# Patient Record
Sex: MALE | Race: WHITE | NOT HISPANIC OR LATINO | URBAN - METROPOLITAN AREA
[De-identification: names, ages, dates, MRNs, and addresses within clinical notes are randomized per-mention and may not be internally consistent; named-entity substitution may affect disease eponyms.]

---

## 2024-01-01 ENCOUNTER — INPATIENT (INPATIENT)
Facility: HOSPITAL | Age: 89
LOS: 5 days | DRG: 871 | End: 2024-07-28
Attending: INTERNAL MEDICINE | Admitting: INTERNAL MEDICINE
Payer: MEDICARE

## 2024-01-01 VITALS
HEIGHT: 65 IN | OXYGEN SATURATION: 98 % | HEART RATE: 109 BPM | DIASTOLIC BLOOD PRESSURE: 61 MMHG | RESPIRATION RATE: 12 BRPM | SYSTOLIC BLOOD PRESSURE: 93 MMHG | WEIGHT: 80.03 LBS | TEMPERATURE: 99 F

## 2024-01-01 VITALS
TEMPERATURE: 97 F | DIASTOLIC BLOOD PRESSURE: 53 MMHG | HEART RATE: 113 BPM | OXYGEN SATURATION: 91 % | RESPIRATION RATE: 20 BRPM | SYSTOLIC BLOOD PRESSURE: 90 MMHG

## 2024-01-01 DIAGNOSIS — N39.0 URINARY TRACT INFECTION, SITE NOT SPECIFIED: ICD-10-CM

## 2024-01-01 DIAGNOSIS — U07.1 COVID-19: ICD-10-CM

## 2024-01-01 DIAGNOSIS — F03.90 UNSPECIFIED DEMENTIA, UNSPECIFIED SEVERITY, WITHOUT BEHAVIORAL DISTURBANCE, PSYCHOTIC DISTURBANCE, MOOD DISTURBANCE, AND ANXIETY: ICD-10-CM

## 2024-01-01 DIAGNOSIS — I95.9 HYPOTENSION, UNSPECIFIED: ICD-10-CM

## 2024-01-01 DIAGNOSIS — E87.0 HYPEROSMOLALITY AND HYPERNATREMIA: ICD-10-CM

## 2024-01-01 DIAGNOSIS — N17.9 ACUTE KIDNEY FAILURE, UNSPECIFIED: ICD-10-CM

## 2024-01-01 DIAGNOSIS — R62.7 ADULT FAILURE TO THRIVE: ICD-10-CM

## 2024-01-01 DIAGNOSIS — R09.89 OTHER SPECIFIED SYMPTOMS AND SIGNS INVOLVING THE CIRCULATORY AND RESPIRATORY SYSTEMS: ICD-10-CM

## 2024-01-01 LAB
A1C WITH ESTIMATED AVERAGE GLUCOSE RESULT: 5.9 % — HIGH (ref 4–5.6)
ALBUMIN SERPL ELPH-MCNC: 2 G/DL — LOW (ref 3.3–5)
ALBUMIN SERPL ELPH-MCNC: 2 G/DL — LOW (ref 3.3–5)
ALBUMIN SERPL ELPH-MCNC: 2.8 G/DL — LOW (ref 3.3–5)
ALBUMIN SERPL ELPH-MCNC: 3.1 G/DL — LOW (ref 3.3–5)
ALP SERPL-CCNC: 66 U/L — SIGNIFICANT CHANGE UP (ref 40–120)
ALP SERPL-CCNC: 78 U/L — SIGNIFICANT CHANGE UP (ref 40–120)
ALP SERPL-CCNC: 79 U/L — SIGNIFICANT CHANGE UP (ref 40–120)
ALP SERPL-CCNC: 90 U/L — SIGNIFICANT CHANGE UP (ref 40–120)
ALT FLD-CCNC: 13 U/L — SIGNIFICANT CHANGE UP (ref 10–45)
ALT FLD-CCNC: 18 U/L — SIGNIFICANT CHANGE UP (ref 10–45)
ALT FLD-CCNC: 20 U/L — SIGNIFICANT CHANGE UP (ref 10–45)
ALT FLD-CCNC: 24 U/L — SIGNIFICANT CHANGE UP (ref 10–45)
ANION GAP SERPL CALC-SCNC: 11 MMOL/L — SIGNIFICANT CHANGE UP (ref 5–17)
ANION GAP SERPL CALC-SCNC: 14 MMOL/L — SIGNIFICANT CHANGE UP (ref 5–17)
ANION GAP SERPL CALC-SCNC: 14 MMOL/L — SIGNIFICANT CHANGE UP (ref 5–17)
ANION GAP SERPL CALC-SCNC: 15 MMOL/L — SIGNIFICANT CHANGE UP (ref 5–17)
ANION GAP SERPL CALC-SCNC: 15 MMOL/L — SIGNIFICANT CHANGE UP (ref 5–17)
ANION GAP SERPL CALC-SCNC: 16 MMOL/L — SIGNIFICANT CHANGE UP (ref 5–17)
ANION GAP SERPL CALC-SCNC: 17 MMOL/L — SIGNIFICANT CHANGE UP (ref 5–17)
ANION GAP SERPL CALC-SCNC: 17 MMOL/L — SIGNIFICANT CHANGE UP (ref 5–17)
ANION GAP SERPL CALC-SCNC: 20 MMOL/L — HIGH (ref 5–17)
ANION GAP SERPL CALC-SCNC: 24 MMOL/L — HIGH (ref 5–17)
ANISOCYTOSIS BLD QL: SLIGHT — SIGNIFICANT CHANGE UP
APPEARANCE UR: ABNORMAL
APTT BLD: 28.4 SEC — SIGNIFICANT CHANGE UP (ref 24.5–35.6)
AST SERPL-CCNC: 33 U/L — SIGNIFICANT CHANGE UP (ref 10–40)
AST SERPL-CCNC: 36 U/L — SIGNIFICANT CHANGE UP (ref 10–40)
AST SERPL-CCNC: 36 U/L — SIGNIFICANT CHANGE UP (ref 10–40)
AST SERPL-CCNC: 55 U/L — HIGH (ref 10–40)
BACTERIA # UR AUTO: NEGATIVE /HPF — SIGNIFICANT CHANGE UP
BASE EXCESS BLDV CALC-SCNC: -2 MMOL/L — SIGNIFICANT CHANGE UP (ref -2–3)
BASOPHILS # BLD AUTO: 0 K/UL — SIGNIFICANT CHANGE UP (ref 0–0.2)
BASOPHILS # BLD AUTO: 0 K/UL — SIGNIFICANT CHANGE UP (ref 0–0.2)
BASOPHILS # BLD AUTO: 0.01 K/UL — SIGNIFICANT CHANGE UP (ref 0–0.2)
BASOPHILS # BLD AUTO: 0.05 K/UL — SIGNIFICANT CHANGE UP (ref 0–0.2)
BASOPHILS NFR BLD AUTO: 0 % — SIGNIFICANT CHANGE UP (ref 0–2)
BASOPHILS NFR BLD AUTO: 0 % — SIGNIFICANT CHANGE UP (ref 0–2)
BASOPHILS NFR BLD AUTO: 0.2 % — SIGNIFICANT CHANGE UP (ref 0–2)
BASOPHILS NFR BLD AUTO: 0.9 % — SIGNIFICANT CHANGE UP (ref 0–2)
BILIRUB SERPL-MCNC: 0.3 MG/DL — SIGNIFICANT CHANGE UP (ref 0.2–1.2)
BILIRUB SERPL-MCNC: 0.6 MG/DL — SIGNIFICANT CHANGE UP (ref 0.2–1.2)
BILIRUB UR-MCNC: NEGATIVE — SIGNIFICANT CHANGE UP
BLD GP AB SCN SERPL QL: NEGATIVE — SIGNIFICANT CHANGE UP
BUN SERPL-MCNC: 100 MG/DL — HIGH (ref 7–23)
BUN SERPL-MCNC: 102 MG/DL — HIGH (ref 7–23)
BUN SERPL-MCNC: 107 MG/DL — HIGH (ref 7–23)
BUN SERPL-MCNC: 122 MG/DL — HIGH (ref 7–23)
BUN SERPL-MCNC: 138 MG/DL — HIGH (ref 7–23)
BUN SERPL-MCNC: 94 MG/DL — HIGH (ref 7–23)
BUN SERPL-MCNC: 95 MG/DL — HIGH (ref 7–23)
BUN SERPL-MCNC: 96 MG/DL — HIGH (ref 7–23)
BUN SERPL-MCNC: 97 MG/DL — HIGH (ref 7–23)
BUN SERPL-MCNC: 97 MG/DL — HIGH (ref 7–23)
BUN SERPL-MCNC: 98 MG/DL — HIGH (ref 7–23)
BUN SERPL-MCNC: 98 MG/DL — HIGH (ref 7–23)
CA-I SERPL-SCNC: 1.39 MMOL/L — HIGH (ref 1.15–1.33)
CALCIUM SERPL-MCNC: 10.1 MG/DL — SIGNIFICANT CHANGE UP (ref 8.4–10.5)
CALCIUM SERPL-MCNC: 10.2 MG/DL — SIGNIFICANT CHANGE UP (ref 8.4–10.5)
CALCIUM SERPL-MCNC: 10.9 MG/DL — HIGH (ref 8.4–10.5)
CALCIUM SERPL-MCNC: 11.1 MG/DL — HIGH (ref 8.4–10.5)
CALCIUM SERPL-MCNC: 11.3 MG/DL — HIGH (ref 8.4–10.5)
CALCIUM SERPL-MCNC: 11.5 MG/DL — HIGH (ref 8.4–10.5)
CALCIUM SERPL-MCNC: 11.6 MG/DL — HIGH (ref 8.4–10.5)
CALCIUM SERPL-MCNC: 12.4 MG/DL — HIGH (ref 8.4–10.5)
CALCIUM SERPL-MCNC: 9.4 MG/DL — SIGNIFICANT CHANGE UP (ref 8.4–10.5)
CALCIUM SERPL-MCNC: 9.5 MG/DL — SIGNIFICANT CHANGE UP (ref 8.4–10.5)
CAST: 4 /LPF — SIGNIFICANT CHANGE UP (ref 0–4)
CHLORIDE BLDV-SCNC: 113 MMOL/L — HIGH (ref 96–108)
CHLORIDE SERPL-SCNC: 104 MMOL/L — SIGNIFICANT CHANGE UP (ref 96–108)
CHLORIDE SERPL-SCNC: 107 MMOL/L — SIGNIFICANT CHANGE UP (ref 96–108)
CHLORIDE SERPL-SCNC: 109 MMOL/L — HIGH (ref 96–108)
CHLORIDE SERPL-SCNC: 112 MMOL/L — HIGH (ref 96–108)
CHLORIDE SERPL-SCNC: 112 MMOL/L — HIGH (ref 96–108)
CHLORIDE SERPL-SCNC: 120 MMOL/L — HIGH (ref 96–108)
CHLORIDE SERPL-SCNC: 122 MMOL/L — HIGH (ref 96–108)
CHLORIDE SERPL-SCNC: 124 MMOL/L — HIGH (ref 96–108)
CHLORIDE SERPL-SCNC: 125 MMOL/L — HIGH (ref 96–108)
CHLORIDE SERPL-SCNC: 99 MMOL/L — SIGNIFICANT CHANGE UP (ref 96–108)
CO2 BLDV-SCNC: 23 MMOL/L — SIGNIFICANT CHANGE UP (ref 22–26)
CO2 SERPL-SCNC: 16 MMOL/L — LOW (ref 22–31)
CO2 SERPL-SCNC: 17 MMOL/L — LOW (ref 22–31)
CO2 SERPL-SCNC: 19 MMOL/L — LOW (ref 22–31)
CO2 SERPL-SCNC: 21 MMOL/L — LOW (ref 22–31)
CO2 SERPL-SCNC: 21 MMOL/L — LOW (ref 22–31)
CO2 SERPL-SCNC: 23 MMOL/L — SIGNIFICANT CHANGE UP (ref 22–31)
CO2 SERPL-SCNC: 24 MMOL/L — SIGNIFICANT CHANGE UP (ref 22–31)
CO2 SERPL-SCNC: 25 MMOL/L — SIGNIFICANT CHANGE UP (ref 22–31)
CO2 SERPL-SCNC: 25 MMOL/L — SIGNIFICANT CHANGE UP (ref 22–31)
CO2 SERPL-SCNC: 27 MMOL/L — SIGNIFICANT CHANGE UP (ref 22–31)
COARSE GRAN CASTS #/AREA URNS AUTO: PRESENT
COLOR SPEC: YELLOW — SIGNIFICANT CHANGE UP
CREAT ?TM UR-MCNC: 58 MG/DL — SIGNIFICANT CHANGE UP
CREAT SERPL-MCNC: 2.79 MG/DL — HIGH (ref 0.5–1.3)
CREAT SERPL-MCNC: 2.86 MG/DL — HIGH (ref 0.5–1.3)
CREAT SERPL-MCNC: 2.86 MG/DL — HIGH (ref 0.5–1.3)
CREAT SERPL-MCNC: 2.87 MG/DL — HIGH (ref 0.5–1.3)
CREAT SERPL-MCNC: 2.99 MG/DL — HIGH (ref 0.5–1.3)
CREAT SERPL-MCNC: 3.02 MG/DL — HIGH (ref 0.5–1.3)
CREAT SERPL-MCNC: 3.03 MG/DL — HIGH (ref 0.5–1.3)
CREAT SERPL-MCNC: 3.06 MG/DL — HIGH (ref 0.5–1.3)
CREAT SERPL-MCNC: 3.11 MG/DL — HIGH (ref 0.5–1.3)
CREAT SERPL-MCNC: 3.3 MG/DL — HIGH (ref 0.5–1.3)
CREAT SERPL-MCNC: 3.53 MG/DL — HIGH (ref 0.5–1.3)
CREAT SERPL-MCNC: 3.95 MG/DL — HIGH (ref 0.5–1.3)
CULTURE RESULTS: NO GROWTH — SIGNIFICANT CHANGE UP
CULTURE RESULTS: SIGNIFICANT CHANGE UP
CULTURE RESULTS: SIGNIFICANT CHANGE UP
DACRYOCYTES BLD QL SMEAR: SLIGHT — SIGNIFICANT CHANGE UP
DIFF PNL FLD: ABNORMAL
EGFR: 13 ML/MIN/1.73M2 — LOW
EGFR: 15 ML/MIN/1.73M2 — LOW
EGFR: 16 ML/MIN/1.73M2 — LOW
EGFR: 17 ML/MIN/1.73M2 — LOW
EGFR: 18 ML/MIN/1.73M2 — LOW
EGFR: 19 ML/MIN/1.73M2 — LOW
EGFR: 20 ML/MIN/1.73M2 — LOW
EOSINOPHIL # BLD AUTO: 0 K/UL — SIGNIFICANT CHANGE UP (ref 0–0.5)
EOSINOPHIL # BLD AUTO: 0 K/UL — SIGNIFICANT CHANGE UP (ref 0–0.5)
EOSINOPHIL # BLD AUTO: 0.04 K/UL — SIGNIFICANT CHANGE UP (ref 0–0.5)
EOSINOPHIL # BLD AUTO: 0.05 K/UL — SIGNIFICANT CHANGE UP (ref 0–0.5)
EOSINOPHIL NFR BLD AUTO: 0 % — SIGNIFICANT CHANGE UP (ref 0–6)
EOSINOPHIL NFR BLD AUTO: 0 % — SIGNIFICANT CHANGE UP (ref 0–6)
EOSINOPHIL NFR BLD AUTO: 0.7 % — SIGNIFICANT CHANGE UP (ref 0–6)
EOSINOPHIL NFR BLD AUTO: 0.9 % — SIGNIFICANT CHANGE UP (ref 0–6)
ESTIMATED AVERAGE GLUCOSE: 123 MG/DL — HIGH (ref 68–114)
FLUAV AG NPH QL: SIGNIFICANT CHANGE UP
FLUBV AG NPH QL: SIGNIFICANT CHANGE UP
GAS PNL BLDA: SIGNIFICANT CHANGE UP
GAS PNL BLDV: 142 MMOL/L — SIGNIFICANT CHANGE UP (ref 136–145)
GAS PNL BLDV: SIGNIFICANT CHANGE UP
GAS PNL BLDV: SIGNIFICANT CHANGE UP
GLUCOSE BLDC GLUCOMTR-MCNC: 125 MG/DL — HIGH (ref 70–99)
GLUCOSE BLDC GLUCOMTR-MCNC: 135 MG/DL — HIGH (ref 70–99)
GLUCOSE BLDC GLUCOMTR-MCNC: 143 MG/DL — HIGH (ref 70–99)
GLUCOSE BLDC GLUCOMTR-MCNC: 151 MG/DL — HIGH (ref 70–99)
GLUCOSE BLDC GLUCOMTR-MCNC: 154 MG/DL — HIGH (ref 70–99)
GLUCOSE BLDC GLUCOMTR-MCNC: 158 MG/DL — HIGH (ref 70–99)
GLUCOSE BLDC GLUCOMTR-MCNC: 159 MG/DL — HIGH (ref 70–99)
GLUCOSE BLDC GLUCOMTR-MCNC: 168 MG/DL — HIGH (ref 70–99)
GLUCOSE BLDC GLUCOMTR-MCNC: 170 MG/DL — HIGH (ref 70–99)
GLUCOSE BLDC GLUCOMTR-MCNC: 170 MG/DL — HIGH (ref 70–99)
GLUCOSE BLDC GLUCOMTR-MCNC: 189 MG/DL — HIGH (ref 70–99)
GLUCOSE BLDC GLUCOMTR-MCNC: 201 MG/DL — HIGH (ref 70–99)
GLUCOSE BLDC GLUCOMTR-MCNC: 202 MG/DL — HIGH (ref 70–99)
GLUCOSE BLDC GLUCOMTR-MCNC: 222 MG/DL — HIGH (ref 70–99)
GLUCOSE BLDC GLUCOMTR-MCNC: 231 MG/DL — HIGH (ref 70–99)
GLUCOSE BLDC GLUCOMTR-MCNC: 234 MG/DL — HIGH (ref 70–99)
GLUCOSE BLDC GLUCOMTR-MCNC: 265 MG/DL — HIGH (ref 70–99)
GLUCOSE BLDC GLUCOMTR-MCNC: 65 MG/DL — LOW (ref 70–99)
GLUCOSE BLDC GLUCOMTR-MCNC: 76 MG/DL — SIGNIFICANT CHANGE UP (ref 70–99)
GLUCOSE BLDV-MCNC: 159 MG/DL — HIGH (ref 70–99)
GLUCOSE SERPL-MCNC: 123 MG/DL — HIGH (ref 70–99)
GLUCOSE SERPL-MCNC: 133 MG/DL — HIGH (ref 70–99)
GLUCOSE SERPL-MCNC: 169 MG/DL — HIGH (ref 70–99)
GLUCOSE SERPL-MCNC: 173 MG/DL — HIGH (ref 70–99)
GLUCOSE SERPL-MCNC: 173 MG/DL — HIGH (ref 70–99)
GLUCOSE SERPL-MCNC: 188 MG/DL — HIGH (ref 70–99)
GLUCOSE SERPL-MCNC: 226 MG/DL — HIGH (ref 70–99)
GLUCOSE SERPL-MCNC: 546 MG/DL — CRITICAL HIGH (ref 70–99)
GLUCOSE SERPL-MCNC: 57 MG/DL — LOW (ref 70–99)
GLUCOSE SERPL-MCNC: 67 MG/DL — LOW (ref 70–99)
GLUCOSE SERPL-MCNC: 77 MG/DL — SIGNIFICANT CHANGE UP (ref 70–99)
GLUCOSE SERPL-MCNC: 99 MG/DL — SIGNIFICANT CHANGE UP (ref 70–99)
GLUCOSE UR QL: NEGATIVE MG/DL — SIGNIFICANT CHANGE UP
HCO3 BLDV-SCNC: 22 MMOL/L — SIGNIFICANT CHANGE UP (ref 22–29)
HCT VFR BLD CALC: 22.5 % — LOW (ref 39–50)
HCT VFR BLD CALC: 24.8 % — LOW (ref 39–50)
HCT VFR BLD CALC: 28 % — LOW (ref 39–50)
HCT VFR BLD CALC: 28.1 % — LOW (ref 39–50)
HCT VFR BLD CALC: 29.1 % — LOW (ref 39–50)
HCT VFR BLD CALC: 33.1 % — LOW (ref 39–50)
HCT VFR BLD CALC: 35.2 % — LOW (ref 39–50)
HCT VFR BLD CALC: 37.8 % — LOW (ref 39–50)
HCT VFR BLDA CALC: 25 % — LOW (ref 39–51)
HGB BLD CALC-MCNC: 8.4 G/DL — LOW (ref 12.6–17.4)
HGB BLD-MCNC: 10.5 G/DL — LOW (ref 13–17)
HGB BLD-MCNC: 11.2 G/DL — LOW (ref 13–17)
HGB BLD-MCNC: 6.9 G/DL — CRITICAL LOW (ref 13–17)
HGB BLD-MCNC: 8.7 G/DL — LOW (ref 13–17)
HGB BLD-MCNC: 8.8 G/DL — LOW (ref 13–17)
HGB BLD-MCNC: 8.9 G/DL — LOW (ref 13–17)
HGB BLD-MCNC: 9.4 G/DL — LOW (ref 13–17)
HGB BLD-MCNC: 9.9 G/DL — LOW (ref 13–17)
IMM GRANULOCYTES NFR BLD AUTO: 0.4 % — SIGNIFICANT CHANGE UP (ref 0–0.9)
INR BLD: 1.42 RATIO — HIGH (ref 0.85–1.18)
KETONES UR-MCNC: NEGATIVE MG/DL — SIGNIFICANT CHANGE UP
LACTATE BLDV-MCNC: 3.7 MMOL/L — HIGH (ref 0.5–2)
LACTATE SERPL-SCNC: 2.1 MMOL/L — HIGH (ref 0.5–2)
LACTATE SERPL-SCNC: 2.4 MMOL/L — HIGH (ref 0.5–2)
LACTATE SERPL-SCNC: 2.5 MMOL/L — HIGH (ref 0.5–2)
LACTATE SERPL-SCNC: 3.2 MMOL/L — HIGH (ref 0.5–2)
LACTATE SERPL-SCNC: 4.4 MMOL/L — CRITICAL HIGH (ref 0.5–2)
LACTATE SERPL-SCNC: 4.7 MMOL/L — CRITICAL HIGH (ref 0.5–2)
LEUKOCYTE ESTERASE UR-ACNC: ABNORMAL
LYMPHOCYTES # BLD AUTO: 0.45 K/UL — LOW (ref 1–3.3)
LYMPHOCYTES # BLD AUTO: 0.54 K/UL — LOW (ref 1–3.3)
LYMPHOCYTES # BLD AUTO: 0.56 K/UL — LOW (ref 1–3.3)
LYMPHOCYTES # BLD AUTO: 1.11 K/UL — SIGNIFICANT CHANGE UP (ref 1–3.3)
LYMPHOCYTES # BLD AUTO: 10.4 % — LOW (ref 13–44)
LYMPHOCYTES # BLD AUTO: 10.5 % — LOW (ref 13–44)
LYMPHOCYTES # BLD AUTO: 20.6 % — SIGNIFICANT CHANGE UP (ref 13–44)
LYMPHOCYTES # BLD AUTO: 5.1 % — LOW (ref 13–44)
MAGNESIUM SERPL-MCNC: 1.8 MG/DL — SIGNIFICANT CHANGE UP (ref 1.6–2.6)
MAGNESIUM SERPL-MCNC: 1.8 MG/DL — SIGNIFICANT CHANGE UP (ref 1.6–2.6)
MAGNESIUM SERPL-MCNC: 2 MG/DL — SIGNIFICANT CHANGE UP (ref 1.6–2.6)
MAGNESIUM SERPL-MCNC: 2.2 MG/DL — SIGNIFICANT CHANGE UP (ref 1.6–2.6)
MAGNESIUM SERPL-MCNC: 2.2 MG/DL — SIGNIFICANT CHANGE UP (ref 1.6–2.6)
MANUAL SMEAR VERIFICATION: SIGNIFICANT CHANGE UP
MCHC RBC-ENTMCNC: 29.6 GM/DL — LOW (ref 32–36)
MCHC RBC-ENTMCNC: 29.8 GM/DL — LOW (ref 32–36)
MCHC RBC-ENTMCNC: 29.9 GM/DL — LOW (ref 32–36)
MCHC RBC-ENTMCNC: 30.2 GM/DL — LOW (ref 32–36)
MCHC RBC-ENTMCNC: 30.7 GM/DL — LOW (ref 32–36)
MCHC RBC-ENTMCNC: 31.6 PG — SIGNIFICANT CHANGE UP (ref 27–34)
MCHC RBC-ENTMCNC: 31.7 GM/DL — LOW (ref 32–36)
MCHC RBC-ENTMCNC: 31.8 PG — SIGNIFICANT CHANGE UP (ref 27–34)
MCHC RBC-ENTMCNC: 32 PG — SIGNIFICANT CHANGE UP (ref 27–34)
MCHC RBC-ENTMCNC: 32 PG — SIGNIFICANT CHANGE UP (ref 27–34)
MCHC RBC-ENTMCNC: 32.1 PG — SIGNIFICANT CHANGE UP (ref 27–34)
MCHC RBC-ENTMCNC: 32.2 PG — SIGNIFICANT CHANGE UP (ref 27–34)
MCHC RBC-ENTMCNC: 32.6 PG — SIGNIFICANT CHANGE UP (ref 27–34)
MCHC RBC-ENTMCNC: 33.3 PG — SIGNIFICANT CHANGE UP (ref 27–34)
MCHC RBC-ENTMCNC: 33.6 GM/DL — SIGNIFICANT CHANGE UP (ref 32–36)
MCHC RBC-ENTMCNC: 35.1 GM/DL — SIGNIFICANT CHANGE UP (ref 32–36)
MCV RBC AUTO: 102.9 FL — HIGH (ref 80–100)
MCV RBC AUTO: 105.1 FL — HIGH (ref 80–100)
MCV RBC AUTO: 105.1 FL — HIGH (ref 80–100)
MCV RBC AUTO: 105.8 FL — HIGH (ref 80–100)
MCV RBC AUTO: 107.3 FL — HIGH (ref 80–100)
MCV RBC AUTO: 108 FL — HIGH (ref 80–100)
MCV RBC AUTO: 91.5 FL — SIGNIFICANT CHANGE UP (ref 80–100)
MCV RBC AUTO: 99.3 FL — SIGNIFICANT CHANGE UP (ref 80–100)
METAMYELOCYTES # FLD: 0.9 % — HIGH (ref 0–0)
METAMYELOCYTES # FLD: 1.7 % — HIGH (ref 0–0)
MONOCYTES # BLD AUTO: 0.08 K/UL — SIGNIFICANT CHANGE UP (ref 0–0.9)
MONOCYTES # BLD AUTO: 0.14 K/UL — SIGNIFICANT CHANGE UP (ref 0–0.9)
MONOCYTES # BLD AUTO: 0.26 K/UL — SIGNIFICANT CHANGE UP (ref 0–0.9)
MONOCYTES # BLD AUTO: 0.46 K/UL — SIGNIFICANT CHANGE UP (ref 0–0.9)
MONOCYTES NFR BLD AUTO: 1.9 % — LOW (ref 2–14)
MONOCYTES NFR BLD AUTO: 2.6 % — SIGNIFICANT CHANGE UP (ref 2–14)
MONOCYTES NFR BLD AUTO: 4.3 % — SIGNIFICANT CHANGE UP (ref 2–14)
MONOCYTES NFR BLD AUTO: 4.8 % — SIGNIFICANT CHANGE UP (ref 2–14)
MRSA PCR RESULT.: DETECTED
NEUTROPHILS # BLD AUTO: 3.78 K/UL — SIGNIFICANT CHANGE UP (ref 1.8–7.4)
NEUTROPHILS # BLD AUTO: 3.94 K/UL — SIGNIFICANT CHANGE UP (ref 1.8–7.4)
NEUTROPHILS # BLD AUTO: 4.53 K/UL — SIGNIFICANT CHANGE UP (ref 1.8–7.4)
NEUTROPHILS # BLD AUTO: 9.44 K/UL — HIGH (ref 1.8–7.4)
NEUTROPHILS NFR BLD AUTO: 73.3 % — SIGNIFICANT CHANGE UP (ref 43–77)
NEUTROPHILS NFR BLD AUTO: 74.4 % — SIGNIFICANT CHANGE UP (ref 43–77)
NEUTROPHILS NFR BLD AUTO: 77.4 % — HIGH (ref 43–77)
NEUTROPHILS NFR BLD AUTO: 79 % — HIGH (ref 43–77)
NEUTS BAND # BLD: 14.5 % — HIGH (ref 0–8)
NEUTS BAND # BLD: 6.1 % — SIGNIFICANT CHANGE UP (ref 0–8)
NEUTS BAND # BLD: 9.4 % — HIGH (ref 0–8)
NITRITE UR-MCNC: NEGATIVE — SIGNIFICANT CHANGE UP
NRBC # BLD: 0 /100 WBCS — SIGNIFICANT CHANGE UP (ref 0–0)
OSMOLALITY UR: 516 MOS/KG — SIGNIFICANT CHANGE UP (ref 300–900)
OVALOCYTES BLD QL SMEAR: SLIGHT — SIGNIFICANT CHANGE UP
PCO2 BLDV: 32 MMHG — LOW (ref 42–55)
PH BLDV: 7.44 — HIGH (ref 7.32–7.43)
PH UR: 6.5 — SIGNIFICANT CHANGE UP (ref 5–8)
PHOSPHATE SERPL-MCNC: 3.1 MG/DL — SIGNIFICANT CHANGE UP (ref 2.5–4.5)
PHOSPHATE SERPL-MCNC: 3.4 MG/DL — SIGNIFICANT CHANGE UP (ref 2.5–4.5)
PHOSPHATE SERPL-MCNC: 4.1 MG/DL — SIGNIFICANT CHANGE UP (ref 2.5–4.5)
PHOSPHATE SERPL-MCNC: 4.2 MG/DL — SIGNIFICANT CHANGE UP (ref 2.5–4.5)
PHOSPHATE SERPL-MCNC: 5 MG/DL — HIGH (ref 2.5–4.5)
PLAT MORPH BLD: NORMAL — SIGNIFICANT CHANGE UP
PLATELET # BLD AUTO: 101 K/UL — LOW (ref 150–400)
PLATELET # BLD AUTO: 107 K/UL — LOW (ref 150–400)
PLATELET # BLD AUTO: 112 K/UL — LOW (ref 150–400)
PLATELET # BLD AUTO: 118 K/UL — LOW (ref 150–400)
PLATELET # BLD AUTO: 128 K/UL — LOW (ref 150–400)
PLATELET # BLD AUTO: 77 K/UL — LOW (ref 150–400)
PLATELET # BLD AUTO: 79 K/UL — LOW (ref 150–400)
PLATELET # BLD AUTO: 85 K/UL — LOW (ref 150–400)
PO2 BLDV: 29 MMHG — SIGNIFICANT CHANGE UP (ref 25–45)
POIKILOCYTOSIS BLD QL AUTO: SLIGHT — SIGNIFICANT CHANGE UP
POTASSIUM BLDV-SCNC: 3.6 MMOL/L — SIGNIFICANT CHANGE UP (ref 3.5–5.1)
POTASSIUM SERPL-MCNC: 3.7 MMOL/L — SIGNIFICANT CHANGE UP (ref 3.5–5.3)
POTASSIUM SERPL-MCNC: 3.7 MMOL/L — SIGNIFICANT CHANGE UP (ref 3.5–5.3)
POTASSIUM SERPL-MCNC: 3.8 MMOL/L — SIGNIFICANT CHANGE UP (ref 3.5–5.3)
POTASSIUM SERPL-MCNC: 3.8 MMOL/L — SIGNIFICANT CHANGE UP (ref 3.5–5.3)
POTASSIUM SERPL-MCNC: 4 MMOL/L — SIGNIFICANT CHANGE UP (ref 3.5–5.3)
POTASSIUM SERPL-MCNC: 4.3 MMOL/L — SIGNIFICANT CHANGE UP (ref 3.5–5.3)
POTASSIUM SERPL-MCNC: 4.4 MMOL/L — SIGNIFICANT CHANGE UP (ref 3.5–5.3)
POTASSIUM SERPL-MCNC: 4.4 MMOL/L — SIGNIFICANT CHANGE UP (ref 3.5–5.3)
POTASSIUM SERPL-MCNC: 4.5 MMOL/L — SIGNIFICANT CHANGE UP (ref 3.5–5.3)
POTASSIUM SERPL-MCNC: 4.5 MMOL/L — SIGNIFICANT CHANGE UP (ref 3.5–5.3)
POTASSIUM SERPL-MCNC: 4.6 MMOL/L — SIGNIFICANT CHANGE UP (ref 3.5–5.3)
POTASSIUM SERPL-MCNC: 4.9 MMOL/L — SIGNIFICANT CHANGE UP (ref 3.5–5.3)
POTASSIUM SERPL-SCNC: 3.7 MMOL/L — SIGNIFICANT CHANGE UP (ref 3.5–5.3)
POTASSIUM SERPL-SCNC: 3.7 MMOL/L — SIGNIFICANT CHANGE UP (ref 3.5–5.3)
POTASSIUM SERPL-SCNC: 3.8 MMOL/L — SIGNIFICANT CHANGE UP (ref 3.5–5.3)
POTASSIUM SERPL-SCNC: 3.8 MMOL/L — SIGNIFICANT CHANGE UP (ref 3.5–5.3)
POTASSIUM SERPL-SCNC: 4 MMOL/L — SIGNIFICANT CHANGE UP (ref 3.5–5.3)
POTASSIUM SERPL-SCNC: 4.3 MMOL/L — SIGNIFICANT CHANGE UP (ref 3.5–5.3)
POTASSIUM SERPL-SCNC: 4.4 MMOL/L — SIGNIFICANT CHANGE UP (ref 3.5–5.3)
POTASSIUM SERPL-SCNC: 4.4 MMOL/L — SIGNIFICANT CHANGE UP (ref 3.5–5.3)
POTASSIUM SERPL-SCNC: 4.5 MMOL/L — SIGNIFICANT CHANGE UP (ref 3.5–5.3)
POTASSIUM SERPL-SCNC: 4.5 MMOL/L — SIGNIFICANT CHANGE UP (ref 3.5–5.3)
POTASSIUM SERPL-SCNC: 4.6 MMOL/L — SIGNIFICANT CHANGE UP (ref 3.5–5.3)
POTASSIUM SERPL-SCNC: 4.9 MMOL/L — SIGNIFICANT CHANGE UP (ref 3.5–5.3)
PROT SERPL-MCNC: 4.8 G/DL — LOW (ref 6–8.3)
PROT SERPL-MCNC: 4.9 G/DL — LOW (ref 6–8.3)
PROT SERPL-MCNC: 6.4 G/DL — SIGNIFICANT CHANGE UP (ref 6–8.3)
PROT SERPL-MCNC: 6.9 G/DL — SIGNIFICANT CHANGE UP (ref 6–8.3)
PROT UR-MCNC: 100 MG/DL
PROTHROM AB SERPL-ACNC: 14.8 SEC — HIGH (ref 9.5–13)
RBC # BLD: 2.14 M/UL — LOW (ref 4.2–5.8)
RBC # BLD: 2.71 M/UL — LOW (ref 4.2–5.8)
RBC # BLD: 2.73 M/UL — LOW (ref 4.2–5.8)
RBC # BLD: 2.77 M/UL — LOW (ref 4.2–5.8)
RBC # BLD: 2.82 M/UL — LOW (ref 4.2–5.8)
RBC # BLD: 3.13 M/UL — LOW (ref 4.2–5.8)
RBC # BLD: 3.28 M/UL — LOW (ref 4.2–5.8)
RBC # BLD: 3.5 M/UL — LOW (ref 4.2–5.8)
RBC # FLD: 14.5 % — SIGNIFICANT CHANGE UP (ref 10.3–14.5)
RBC # FLD: 14.9 % — HIGH (ref 10.3–14.5)
RBC # FLD: 15.1 % — HIGH (ref 10.3–14.5)
RBC # FLD: 15.1 % — HIGH (ref 10.3–14.5)
RBC # FLD: 15.2 % — HIGH (ref 10.3–14.5)
RBC # FLD: 15.4 % — HIGH (ref 10.3–14.5)
RBC # FLD: 15.4 % — HIGH (ref 10.3–14.5)
RBC # FLD: 15.5 % — HIGH (ref 10.3–14.5)
RBC BLD AUTO: ABNORMAL
RBC BLD AUTO: SIGNIFICANT CHANGE UP
RBC BLD AUTO: SIGNIFICANT CHANGE UP
RBC CASTS # UR COMP ASSIST: 44 /HPF — HIGH (ref 0–4)
REVIEW: SIGNIFICANT CHANGE UP
RH IG SCN BLD-IMP: POSITIVE — SIGNIFICANT CHANGE UP
RSV RNA NPH QL NAA+NON-PROBE: SIGNIFICANT CHANGE UP
S AUREUS DNA NOSE QL NAA+PROBE: DETECTED
SAO2 % BLDV: 48.1 % — LOW (ref 67–88)
SARS-COV-2 RNA SPEC QL NAA+PROBE: DETECTED
SODIUM SERPL-SCNC: 137 MMOL/L — SIGNIFICANT CHANGE UP (ref 135–145)
SODIUM SERPL-SCNC: 142 MMOL/L — SIGNIFICANT CHANGE UP (ref 135–145)
SODIUM SERPL-SCNC: 143 MMOL/L — SIGNIFICANT CHANGE UP (ref 135–145)
SODIUM SERPL-SCNC: 143 MMOL/L — SIGNIFICANT CHANGE UP (ref 135–145)
SODIUM SERPL-SCNC: 149 MMOL/L — HIGH (ref 135–145)
SODIUM SERPL-SCNC: 149 MMOL/L — HIGH (ref 135–145)
SODIUM SERPL-SCNC: 151 MMOL/L — HIGH (ref 135–145)
SODIUM SERPL-SCNC: 159 MMOL/L — HIGH (ref 135–145)
SODIUM SERPL-SCNC: 162 MMOL/L — CRITICAL HIGH (ref 135–145)
SODIUM UR-SCNC: 29 MMOL/L — SIGNIFICANT CHANGE UP
SP GR SPEC: 1.02 — SIGNIFICANT CHANGE UP (ref 1–1.03)
SPECIMEN SOURCE: SIGNIFICANT CHANGE UP
SQUAMOUS # UR AUTO: 2 /HPF — SIGNIFICANT CHANGE UP (ref 0–5)
TROPONIN T, HIGH SENSITIVITY RESULT: 194 NG/L — HIGH (ref 0–51)
TROPONIN T, HIGH SENSITIVITY RESULT: 226 NG/L — HIGH (ref 0–51)
TROPONIN T, HIGH SENSITIVITY RESULT: 236 NG/L — HIGH (ref 0–51)
TROPONIN T, HIGH SENSITIVITY RESULT: 236 NG/L — HIGH (ref 0–51)
TROPONIN T, HIGH SENSITIVITY RESULT: 265 NG/L — HIGH (ref 0–51)
TSH SERPL-MCNC: 0.33 UIU/ML — SIGNIFICANT CHANGE UP (ref 0.27–4.2)
UROBILINOGEN FLD QL: 1 MG/DL — SIGNIFICANT CHANGE UP (ref 0.2–1)
WBC # BLD: 10.62 K/UL — HIGH (ref 3.8–10.5)
WBC # BLD: 4.36 K/UL — SIGNIFICANT CHANGE UP (ref 3.8–10.5)
WBC # BLD: 4.56 K/UL — SIGNIFICANT CHANGE UP (ref 3.8–10.5)
WBC # BLD: 5.32 K/UL — SIGNIFICANT CHANGE UP (ref 3.8–10.5)
WBC # BLD: 5.38 K/UL — SIGNIFICANT CHANGE UP (ref 3.8–10.5)
WBC # BLD: 6.7 K/UL — SIGNIFICANT CHANGE UP (ref 3.8–10.5)
WBC # BLD: 7 K/UL — SIGNIFICANT CHANGE UP (ref 3.8–10.5)
WBC # BLD: 7.72 K/UL — SIGNIFICANT CHANGE UP (ref 3.8–10.5)
WBC # FLD AUTO: 10.62 K/UL — HIGH (ref 3.8–10.5)
WBC # FLD AUTO: 4.36 K/UL — SIGNIFICANT CHANGE UP (ref 3.8–10.5)
WBC # FLD AUTO: 4.56 K/UL — SIGNIFICANT CHANGE UP (ref 3.8–10.5)
WBC # FLD AUTO: 5.32 K/UL — SIGNIFICANT CHANGE UP (ref 3.8–10.5)
WBC # FLD AUTO: 5.38 K/UL — SIGNIFICANT CHANGE UP (ref 3.8–10.5)
WBC # FLD AUTO: 6.7 K/UL — SIGNIFICANT CHANGE UP (ref 3.8–10.5)
WBC # FLD AUTO: 7 K/UL — SIGNIFICANT CHANGE UP (ref 3.8–10.5)
WBC # FLD AUTO: 7.72 K/UL — SIGNIFICANT CHANGE UP (ref 3.8–10.5)
WBC UR QL: 9 /HPF — HIGH (ref 0–5)

## 2024-01-01 PROCEDURE — 71045 X-RAY EXAM CHEST 1 VIEW: CPT

## 2024-01-01 PROCEDURE — 81001 URINALYSIS AUTO W/SCOPE: CPT

## 2024-01-01 PROCEDURE — 84443 ASSAY THYROID STIM HORMONE: CPT

## 2024-01-01 PROCEDURE — 80048 BASIC METABOLIC PNL TOTAL CA: CPT

## 2024-01-01 PROCEDURE — 85018 HEMOGLOBIN: CPT

## 2024-01-01 PROCEDURE — 93970 EXTREMITY STUDY: CPT | Mod: 26

## 2024-01-01 PROCEDURE — 93970 EXTREMITY STUDY: CPT

## 2024-01-01 PROCEDURE — 87040 BLOOD CULTURE FOR BACTERIA: CPT

## 2024-01-01 PROCEDURE — 84300 ASSAY OF URINE SODIUM: CPT

## 2024-01-01 PROCEDURE — 82962 GLUCOSE BLOOD TEST: CPT

## 2024-01-01 PROCEDURE — 92610 EVALUATE SWALLOWING FUNCTION: CPT

## 2024-01-01 PROCEDURE — 84484 ASSAY OF TROPONIN QUANT: CPT

## 2024-01-01 PROCEDURE — 93010 ELECTROCARDIOGRAM REPORT: CPT

## 2024-01-01 PROCEDURE — 87086 URINE CULTURE/COLONY COUNT: CPT

## 2024-01-01 PROCEDURE — 87640 STAPH A DNA AMP PROBE: CPT

## 2024-01-01 PROCEDURE — 86850 RBC ANTIBODY SCREEN: CPT

## 2024-01-01 PROCEDURE — 83735 ASSAY OF MAGNESIUM: CPT

## 2024-01-01 PROCEDURE — 84100 ASSAY OF PHOSPHORUS: CPT

## 2024-01-01 PROCEDURE — 93010 ELECTROCARDIOGRAM REPORT: CPT | Mod: 76

## 2024-01-01 PROCEDURE — 36600 WITHDRAWAL OF ARTERIAL BLOOD: CPT

## 2024-01-01 PROCEDURE — 85610 PROTHROMBIN TIME: CPT

## 2024-01-01 PROCEDURE — 84132 ASSAY OF SERUM POTASSIUM: CPT

## 2024-01-01 PROCEDURE — 85027 COMPLETE CBC AUTOMATED: CPT

## 2024-01-01 PROCEDURE — 83935 ASSAY OF URINE OSMOLALITY: CPT

## 2024-01-01 PROCEDURE — 93005 ELECTROCARDIOGRAM TRACING: CPT

## 2024-01-01 PROCEDURE — 82803 BLOOD GASES ANY COMBINATION: CPT

## 2024-01-01 PROCEDURE — 85025 COMPLETE CBC W/AUTO DIFF WBC: CPT

## 2024-01-01 PROCEDURE — 82435 ASSAY OF BLOOD CHLORIDE: CPT

## 2024-01-01 PROCEDURE — 87641 MR-STAPH DNA AMP PROBE: CPT

## 2024-01-01 PROCEDURE — 99291 CRITICAL CARE FIRST HOUR: CPT

## 2024-01-01 PROCEDURE — 99285 EMERGENCY DEPT VISIT HI MDM: CPT

## 2024-01-01 PROCEDURE — 87637 SARSCOV2&INF A&B&RSV AMP PRB: CPT

## 2024-01-01 PROCEDURE — C8924: CPT

## 2024-01-01 PROCEDURE — 84295 ASSAY OF SERUM SODIUM: CPT

## 2024-01-01 PROCEDURE — 80053 COMPREHEN METABOLIC PANEL: CPT

## 2024-01-01 PROCEDURE — 85014 HEMATOCRIT: CPT

## 2024-01-01 PROCEDURE — 93308 TTE F-UP OR LMTD: CPT | Mod: 26

## 2024-01-01 PROCEDURE — 76770 US EXAM ABDO BACK WALL COMP: CPT

## 2024-01-01 PROCEDURE — 86901 BLOOD TYPING SEROLOGIC RH(D): CPT

## 2024-01-01 PROCEDURE — 86900 BLOOD TYPING SEROLOGIC ABO: CPT

## 2024-01-01 PROCEDURE — 96374 THER/PROPH/DIAG INJ IV PUSH: CPT

## 2024-01-01 PROCEDURE — 96375 TX/PRO/DX INJ NEW DRUG ADDON: CPT

## 2024-01-01 PROCEDURE — 83036 HEMOGLOBIN GLYCOSYLATED A1C: CPT

## 2024-01-01 PROCEDURE — 83605 ASSAY OF LACTIC ACID: CPT

## 2024-01-01 PROCEDURE — 82330 ASSAY OF CALCIUM: CPT

## 2024-01-01 PROCEDURE — 85730 THROMBOPLASTIN TIME PARTIAL: CPT

## 2024-01-01 PROCEDURE — 76770 US EXAM ABDO BACK WALL COMP: CPT | Mod: 26

## 2024-01-01 PROCEDURE — 71045 X-RAY EXAM CHEST 1 VIEW: CPT | Mod: 26

## 2024-01-01 PROCEDURE — 82947 ASSAY GLUCOSE BLOOD QUANT: CPT

## 2024-01-01 PROCEDURE — 94640 AIRWAY INHALATION TREATMENT: CPT

## 2024-01-01 PROCEDURE — 82570 ASSAY OF URINE CREATININE: CPT

## 2024-01-01 PROCEDURE — 36415 COLL VENOUS BLD VENIPUNCTURE: CPT

## 2024-01-01 RX ORDER — PIPERACILLIN SODIUM, TAZOBACTAM SODIUM 3; .375 G/15ML; G/15ML
3.38 INJECTION, POWDER, LYOPHILIZED, FOR SOLUTION INTRAVENOUS ONCE
Refills: 0 | Status: COMPLETED | OUTPATIENT
Start: 2024-01-01 | End: 2024-01-01

## 2024-01-01 RX ORDER — SODIUM BICARBONATE 0.9MEQ/ML
0.31 SYRINGE (ML) INTRAVENOUS
Qty: 150 | Refills: 0 | Status: DISCONTINUED | OUTPATIENT
Start: 2024-01-01 | End: 2024-01-01

## 2024-01-01 RX ORDER — MULTIVITAMIN/IRON/FOLIC ACID 18MG-0.4MG
1 TABLET ORAL DAILY
Refills: 0 | Status: DISCONTINUED | OUTPATIENT
Start: 2024-01-01 | End: 2024-01-01

## 2024-01-01 RX ORDER — LORATADINE 10 MG
10 TABLET ORAL DAILY
Refills: 0 | Status: DISCONTINUED | OUTPATIENT
Start: 2024-01-01 | End: 2024-01-01

## 2024-01-01 RX ORDER — NATEGLINIDE 120 MG/1
1 TABLET, FILM COATED ORAL
Refills: 0 | DISCHARGE

## 2024-01-01 RX ORDER — LORATADINE 10 MG
1 TABLET ORAL
Refills: 0 | DISCHARGE

## 2024-01-01 RX ORDER — LYSINE HCL 500 MG
500 TABLET ORAL DAILY
Refills: 0 | Status: DISCONTINUED | OUTPATIENT
Start: 2024-01-01 | End: 2024-01-01

## 2024-01-01 RX ORDER — MIDODRINE HYDROCHLORIDE 2.5 MG/1
5 TABLET ORAL ONCE
Refills: 0 | Status: COMPLETED | OUTPATIENT
Start: 2024-01-01 | End: 2024-01-01

## 2024-01-01 RX ORDER — DEXTROSE MONOHYDRATE, SODIUM CHLORIDE, SODIUM LACTATE, CALCIUM CHLORIDE, MAGNESIUM CHLORIDE 1.5; 538; 448; 18.4; 5.08 G/100ML; MG/100ML; MG/100ML; MG/100ML; MG/100ML
1150 SOLUTION INTRAPERITONEAL ONCE
Refills: 0 | Status: COMPLETED | OUTPATIENT
Start: 2024-01-01 | End: 2024-01-01

## 2024-01-01 RX ORDER — METOPROLOL TARTRATE 100 MG
25 TABLET ORAL
Refills: 0 | Status: DISCONTINUED | OUTPATIENT
Start: 2024-01-01 | End: 2024-01-01

## 2024-01-01 RX ORDER — REMDESIVIR 100 MG/1
INJECTION, POWDER, LYOPHILIZED, FOR SOLUTION INTRAVENOUS
Refills: 0 | Status: COMPLETED | OUTPATIENT
Start: 2024-01-01 | End: 2024-01-01

## 2024-01-01 RX ORDER — TAMSULOSIN HCL 0.4 MG
0.4 CAPSULE ORAL AT BEDTIME
Refills: 0 | Status: DISCONTINUED | OUTPATIENT
Start: 2024-01-01 | End: 2024-01-01

## 2024-01-01 RX ORDER — HYDROCORTISONE ACETATE
50 POWDER (GRAM) MISCELLANEOUS EVERY 8 HOURS
Refills: 0 | Status: DISCONTINUED | OUTPATIENT
Start: 2024-01-01 | End: 2024-01-01

## 2024-01-01 RX ORDER — DEXTROSE MONOHYDRATE, SODIUM CHLORIDE, SODIUM LACTATE, CALCIUM CHLORIDE, MAGNESIUM CHLORIDE 1.5; 538; 448; 18.4; 5.08 G/100ML; MG/100ML; MG/100ML; MG/100ML; MG/100ML
1000 SOLUTION INTRAPERITONEAL
Refills: 0 | Status: DISCONTINUED | OUTPATIENT
Start: 2024-01-01 | End: 2024-01-01

## 2024-01-01 RX ORDER — ASPIRIN 500 MG
81 TABLET ORAL DAILY
Refills: 0 | Status: DISCONTINUED | OUTPATIENT
Start: 2024-01-01 | End: 2024-01-01

## 2024-01-01 RX ORDER — IPRATROPIUM BROMIDE AND ALBUTEROL SULFATE 2.5; .5 MG/3ML; MG/3ML
3 SOLUTION RESPIRATORY (INHALATION) EVERY 6 HOURS
Refills: 0 | Status: DISCONTINUED | OUTPATIENT
Start: 2024-01-01 | End: 2024-01-01

## 2024-01-01 RX ORDER — URSODIOL 300 MG
300 CAPSULE ORAL
Refills: 0 | DISCHARGE

## 2024-01-01 RX ORDER — DEXTROSE MONOHYDRATE, SODIUM CHLORIDE, SODIUM LACTATE, CALCIUM CHLORIDE, MAGNESIUM CHLORIDE 1.5; 538; 448; 18.4; 5.08 G/100ML; MG/100ML; MG/100ML; MG/100ML; MG/100ML
500 SOLUTION INTRAPERITONEAL ONCE
Refills: 0 | Status: COMPLETED | OUTPATIENT
Start: 2024-01-01 | End: 2024-01-01

## 2024-01-01 RX ORDER — CHLORHEXIDINE GLUCONATE 500 MG/1
1 CLOTH TOPICAL DAILY
Refills: 0 | Status: DISCONTINUED | OUTPATIENT
Start: 2024-01-01 | End: 2024-01-01

## 2024-01-01 RX ORDER — ERGOCALCIFEROL (VITAMIN D2) 200 MCG/ML
1 DROPS ORAL
Refills: 0 | DISCHARGE

## 2024-01-01 RX ORDER — BACTERIOSTATIC SODIUM CHLORIDE 0.9 %
250 VIAL (ML) INJECTION ONCE
Refills: 0 | Status: COMPLETED | OUTPATIENT
Start: 2024-01-01 | End: 2024-01-01

## 2024-01-01 RX ORDER — PIPERACILLIN SODIUM, TAZOBACTAM SODIUM 3; .375 G/15ML; G/15ML
3.38 INJECTION, POWDER, LYOPHILIZED, FOR SOLUTION INTRAVENOUS EVERY 12 HOURS
Refills: 0 | Status: DISCONTINUED | OUTPATIENT
Start: 2024-01-01 | End: 2024-01-01

## 2024-01-01 RX ORDER — CYANOCOBALAMIN/FOLIC AC/VIT B6 2-2.5-25MG
1 TABLET ORAL DAILY
Refills: 0 | Status: DISCONTINUED | OUTPATIENT
Start: 2024-01-01 | End: 2024-01-01

## 2024-01-01 RX ORDER — ACETAMINOPHEN 500 MG
550 TABLET ORAL ONCE
Refills: 0 | Status: COMPLETED | OUTPATIENT
Start: 2024-01-01 | End: 2024-01-01

## 2024-01-01 RX ORDER — PANTOPRAZOLE SODIUM 20 MG/1
40 TABLET, DELAYED RELEASE ORAL EVERY 12 HOURS
Refills: 0 | Status: DISCONTINUED | OUTPATIENT
Start: 2024-01-01 | End: 2024-01-01

## 2024-01-01 RX ORDER — MIDODRINE HYDROCHLORIDE 2.5 MG/1
10 TABLET ORAL EVERY 8 HOURS
Refills: 0 | Status: DISCONTINUED | OUTPATIENT
Start: 2024-01-01 | End: 2024-01-01

## 2024-01-01 RX ORDER — ALLOPURINOL 100 MG/1
0 TABLET ORAL
Refills: 0 | DISCHARGE

## 2024-01-01 RX ORDER — DEXTROSE MONOHYDRATE, SODIUM CHLORIDE, SODIUM LACTATE, CALCIUM CHLORIDE, MAGNESIUM CHLORIDE 1.5; 538; 448; 18.4; 5.08 G/100ML; MG/100ML; MG/100ML; MG/100ML; MG/100ML
500 SOLUTION INTRAPERITONEAL
Refills: 0 | Status: DISCONTINUED | OUTPATIENT
Start: 2024-01-01 | End: 2024-01-01

## 2024-01-01 RX ORDER — DEXTROSE MONOHYDRATE, SODIUM CHLORIDE, SODIUM LACTATE, CALCIUM CHLORIDE, MAGNESIUM CHLORIDE 1.5; 538; 448; 18.4; 5.08 G/100ML; MG/100ML; MG/100ML; MG/100ML; MG/100ML
250 SOLUTION INTRAPERITONEAL ONCE
Refills: 0 | Status: COMPLETED | OUTPATIENT
Start: 2024-01-01 | End: 2024-01-01

## 2024-01-01 RX ORDER — PANTOPRAZOLE SODIUM 20 MG/1
40 TABLET, DELAYED RELEASE ORAL
Refills: 0 | Status: DISCONTINUED | OUTPATIENT
Start: 2024-01-01 | End: 2024-01-01

## 2024-01-01 RX ORDER — HEPARIN SODIUM 1000 [USP'U]/ML
5000 INJECTION, SOLUTION INTRAVENOUS; SUBCUTANEOUS EVERY 12 HOURS
Refills: 0 | Status: DISCONTINUED | OUTPATIENT
Start: 2024-01-01 | End: 2024-01-01

## 2024-01-01 RX ORDER — REMDESIVIR 100 MG/1
100 INJECTION, POWDER, LYOPHILIZED, FOR SOLUTION INTRAVENOUS EVERY 24 HOURS
Refills: 0 | Status: COMPLETED | OUTPATIENT
Start: 2024-01-01 | End: 2024-01-01

## 2024-01-01 RX ORDER — LYSINE HCL 500 MG
500 TABLET ORAL
Refills: 0 | DISCHARGE

## 2024-01-01 RX ORDER — CALCIUM CARBONATE/VITAMIN D2 600-3.125
1 TABLET ORAL DAILY
Refills: 0 | Status: DISCONTINUED | OUTPATIENT
Start: 2024-01-01 | End: 2024-01-01

## 2024-01-01 RX ORDER — ASPIRIN 500 MG
81 TABLET ORAL
Refills: 0 | DISCHARGE

## 2024-01-01 RX ORDER — MUPIROCIN CALCIUM 20 MG/G
1 CREAM TOPICAL
Refills: 0 | Status: DISCONTINUED | OUTPATIENT
Start: 2024-01-01 | End: 2024-01-01

## 2024-01-01 RX ORDER — REMDESIVIR 100 MG/1
200 INJECTION, POWDER, LYOPHILIZED, FOR SOLUTION INTRAVENOUS EVERY 24 HOURS
Refills: 0 | Status: COMPLETED | OUTPATIENT
Start: 2024-01-01 | End: 2024-01-01

## 2024-01-01 RX ADMIN — Medication 25 MILLIGRAM(S): at 06:09

## 2024-01-01 RX ADMIN — Medication 250 MILLILITER(S): at 17:30

## 2024-01-01 RX ADMIN — PIPERACILLIN SODIUM, TAZOBACTAM SODIUM 25 GRAM(S): 3; .375 INJECTION, POWDER, LYOPHILIZED, FOR SOLUTION INTRAVENOUS at 18:22

## 2024-01-01 RX ADMIN — DEXTROSE MONOHYDRATE, SODIUM CHLORIDE, SODIUM LACTATE, CALCIUM CHLORIDE, MAGNESIUM CHLORIDE 75 MILLILITER(S): 1.5; 538; 448; 18.4; 5.08 SOLUTION INTRAPERITONEAL at 00:35

## 2024-01-01 RX ADMIN — MIDODRINE HYDROCHLORIDE 10 MILLIGRAM(S): 2.5 TABLET ORAL at 05:41

## 2024-01-01 RX ADMIN — PANTOPRAZOLE SODIUM 40 MILLIGRAM(S): 20 TABLET, DELAYED RELEASE ORAL at 17:01

## 2024-01-01 RX ADMIN — IPRATROPIUM BROMIDE AND ALBUTEROL SULFATE 3 MILLILITER(S): 2.5; .5 SOLUTION RESPIRATORY (INHALATION) at 19:14

## 2024-01-01 RX ADMIN — MIDODRINE HYDROCHLORIDE 10 MILLIGRAM(S): 2.5 TABLET ORAL at 21:05

## 2024-01-01 RX ADMIN — Medication 1 APPLICATION(S): at 21:13

## 2024-01-01 RX ADMIN — MIDODRINE HYDROCHLORIDE 10 MILLIGRAM(S): 2.5 TABLET ORAL at 21:03

## 2024-01-01 RX ADMIN — IPRATROPIUM BROMIDE AND ALBUTEROL SULFATE 3 MILLILITER(S): 2.5; .5 SOLUTION RESPIRATORY (INHALATION) at 00:52

## 2024-01-01 RX ADMIN — Medication 1 APPLICATION(S): at 22:33

## 2024-01-01 RX ADMIN — IPRATROPIUM BROMIDE AND ALBUTEROL SULFATE 3 MILLILITER(S): 2.5; .5 SOLUTION RESPIRATORY (INHALATION) at 21:06

## 2024-01-01 RX ADMIN — MUPIROCIN CALCIUM 1 APPLICATION(S): 20 CREAM TOPICAL at 17:08

## 2024-01-01 RX ADMIN — Medication 220 MILLIGRAM(S): at 12:54

## 2024-01-01 RX ADMIN — Medication 1 MILLIGRAM(S): at 12:25

## 2024-01-01 RX ADMIN — Medication 81 MILLIGRAM(S): at 13:06

## 2024-01-01 RX ADMIN — Medication 50 MILLIGRAM(S): at 20:41

## 2024-01-01 RX ADMIN — Medication 20 MILLIGRAM(S): at 20:38

## 2024-01-01 RX ADMIN — PANTOPRAZOLE SODIUM 40 MILLIGRAM(S): 20 TABLET, DELAYED RELEASE ORAL at 05:55

## 2024-01-01 RX ADMIN — Medication 20 MILLIGRAM(S): at 22:33

## 2024-01-01 RX ADMIN — Medication 1 TABLET(S): at 10:48

## 2024-01-01 RX ADMIN — MUPIROCIN CALCIUM 1 APPLICATION(S): 20 CREAM TOPICAL at 05:19

## 2024-01-01 RX ADMIN — IPRATROPIUM BROMIDE AND ALBUTEROL SULFATE 3 MILLILITER(S): 2.5; .5 SOLUTION RESPIRATORY (INHALATION) at 17:01

## 2024-01-01 RX ADMIN — Medication 81 MILLIGRAM(S): at 12:53

## 2024-01-01 RX ADMIN — Medication 25 MILLIGRAM(S): at 18:08

## 2024-01-01 RX ADMIN — Medication 75 MEQ/KG/HR: at 16:24

## 2024-01-01 RX ADMIN — Medication 20 MILLIGRAM(S): at 21:03

## 2024-01-01 RX ADMIN — Medication 1 MILLIGRAM(S): at 10:49

## 2024-01-01 RX ADMIN — Medication 1 TABLET(S): at 12:53

## 2024-01-01 RX ADMIN — Medication 1 TABLET(S): at 13:06

## 2024-01-01 RX ADMIN — Medication 500 MILLIGRAM(S): at 12:42

## 2024-01-01 RX ADMIN — Medication 1 APPLICATION(S): at 23:23

## 2024-01-01 RX ADMIN — CHLORHEXIDINE GLUCONATE 1 APPLICATION(S): 500 CLOTH TOPICAL at 12:17

## 2024-01-01 RX ADMIN — Medication 50 MILLIGRAM(S): at 05:01

## 2024-01-01 RX ADMIN — Medication 1 MILLIGRAM(S): at 12:43

## 2024-01-01 RX ADMIN — IPRATROPIUM BROMIDE AND ALBUTEROL SULFATE 3 MILLILITER(S): 2.5; .5 SOLUTION RESPIRATORY (INHALATION) at 10:48

## 2024-01-01 RX ADMIN — Medication 20 MILLIGRAM(S): at 21:39

## 2024-01-01 RX ADMIN — Medication 20 MILLIGRAM(S): at 21:05

## 2024-01-01 RX ADMIN — CHLORHEXIDINE GLUCONATE 1 APPLICATION(S): 500 CLOTH TOPICAL at 13:06

## 2024-01-01 RX ADMIN — REMDESIVIR 200 MILLIGRAM(S): 100 INJECTION, POWDER, LYOPHILIZED, FOR SOLUTION INTRAVENOUS at 18:55

## 2024-01-01 RX ADMIN — MUPIROCIN CALCIUM 1 APPLICATION(S): 20 CREAM TOPICAL at 18:30

## 2024-01-01 RX ADMIN — IPRATROPIUM BROMIDE AND ALBUTEROL SULFATE 3 MILLILITER(S): 2.5; .5 SOLUTION RESPIRATORY (INHALATION) at 18:23

## 2024-01-01 RX ADMIN — IPRATROPIUM BROMIDE AND ALBUTEROL SULFATE 3 MILLILITER(S): 2.5; .5 SOLUTION RESPIRATORY (INHALATION) at 17:07

## 2024-01-01 RX ADMIN — DEXTROSE MONOHYDRATE, SODIUM CHLORIDE, SODIUM LACTATE, CALCIUM CHLORIDE, MAGNESIUM CHLORIDE 100 MILLILITER(S): 1.5; 538; 448; 18.4; 5.08 SOLUTION INTRAPERITONEAL at 20:59

## 2024-01-01 RX ADMIN — Medication 75 MEQ/KG/HR: at 09:17

## 2024-01-01 RX ADMIN — PANTOPRAZOLE SODIUM 40 MILLIGRAM(S): 20 TABLET, DELAYED RELEASE ORAL at 17:07

## 2024-01-01 RX ADMIN — PIPERACILLIN SODIUM, TAZOBACTAM SODIUM 25 GRAM(S): 3; .375 INJECTION, POWDER, LYOPHILIZED, FOR SOLUTION INTRAVENOUS at 20:37

## 2024-01-01 RX ADMIN — HEPARIN SODIUM 5000 UNIT(S): 1000 INJECTION, SOLUTION INTRAVENOUS; SUBCUTANEOUS at 05:55

## 2024-01-01 RX ADMIN — Medication 500 MILLIGRAM(S): at 12:25

## 2024-01-01 RX ADMIN — DEXTROSE MONOHYDRATE, SODIUM CHLORIDE, SODIUM LACTATE, CALCIUM CHLORIDE, MAGNESIUM CHLORIDE 100 MILLILITER(S): 1.5; 538; 448; 18.4; 5.08 SOLUTION INTRAPERITONEAL at 12:25

## 2024-01-01 RX ADMIN — MIDODRINE HYDROCHLORIDE 10 MILLIGRAM(S): 2.5 TABLET ORAL at 05:02

## 2024-01-01 RX ADMIN — Medication 25 MILLIGRAM(S): at 05:55

## 2024-01-01 RX ADMIN — MIDODRINE HYDROCHLORIDE 10 MILLIGRAM(S): 2.5 TABLET ORAL at 05:01

## 2024-01-01 RX ADMIN — HEPARIN SODIUM 5000 UNIT(S): 1000 INJECTION, SOLUTION INTRAVENOUS; SUBCUTANEOUS at 18:55

## 2024-01-01 RX ADMIN — Medication 1 MILLIGRAM(S): at 12:07

## 2024-01-01 RX ADMIN — PANTOPRAZOLE SODIUM 40 MILLIGRAM(S): 20 TABLET, DELAYED RELEASE ORAL at 04:58

## 2024-01-01 RX ADMIN — DEXTROSE MONOHYDRATE, SODIUM CHLORIDE, SODIUM LACTATE, CALCIUM CHLORIDE, MAGNESIUM CHLORIDE 250 MILLILITER(S): 1.5; 538; 448; 18.4; 5.08 SOLUTION INTRAPERITONEAL at 10:36

## 2024-01-01 RX ADMIN — IPRATROPIUM BROMIDE AND ALBUTEROL SULFATE 3 MILLILITER(S): 2.5; .5 SOLUTION RESPIRATORY (INHALATION) at 12:52

## 2024-01-01 RX ADMIN — CHLORHEXIDINE GLUCONATE 1 APPLICATION(S): 500 CLOTH TOPICAL at 12:53

## 2024-01-01 RX ADMIN — PANTOPRAZOLE SODIUM 40 MILLIGRAM(S): 20 TABLET, DELAYED RELEASE ORAL at 05:01

## 2024-01-01 RX ADMIN — Medication 250 MILLILITER(S): at 18:30

## 2024-01-01 RX ADMIN — MIDODRINE HYDROCHLORIDE 10 MILLIGRAM(S): 2.5 TABLET ORAL at 13:07

## 2024-01-01 RX ADMIN — IPRATROPIUM BROMIDE AND ALBUTEROL SULFATE 3 MILLILITER(S): 2.5; .5 SOLUTION RESPIRATORY (INHALATION) at 05:01

## 2024-01-01 RX ADMIN — Medication 500 MILLIGRAM(S): at 12:53

## 2024-01-01 RX ADMIN — REMDESIVIR 200 MILLIGRAM(S): 100 INJECTION, POWDER, LYOPHILIZED, FOR SOLUTION INTRAVENOUS at 19:15

## 2024-01-01 RX ADMIN — PANTOPRAZOLE SODIUM 40 MILLIGRAM(S): 20 TABLET, DELAYED RELEASE ORAL at 19:11

## 2024-01-01 RX ADMIN — PIPERACILLIN SODIUM, TAZOBACTAM SODIUM 25 GRAM(S): 3; .375 INJECTION, POWDER, LYOPHILIZED, FOR SOLUTION INTRAVENOUS at 17:08

## 2024-01-01 RX ADMIN — Medication 100 MILLIGRAM(S): at 12:25

## 2024-01-01 RX ADMIN — Medication 500 MILLIGRAM(S): at 10:49

## 2024-01-01 RX ADMIN — Medication 50 MILLIGRAM(S): at 21:03

## 2024-01-01 RX ADMIN — MIDODRINE HYDROCHLORIDE 10 MILLIGRAM(S): 2.5 TABLET ORAL at 13:04

## 2024-01-01 RX ADMIN — IPRATROPIUM BROMIDE AND ALBUTEROL SULFATE 3 MILLILITER(S): 2.5; .5 SOLUTION RESPIRATORY (INHALATION) at 00:44

## 2024-01-01 RX ADMIN — Medication 0.4 MILLIGRAM(S): at 21:39

## 2024-01-01 RX ADMIN — CHLORHEXIDINE GLUCONATE 1 APPLICATION(S): 500 CLOTH TOPICAL at 12:00

## 2024-01-01 RX ADMIN — Medication 1 APPLICATION(S): at 20:41

## 2024-01-01 RX ADMIN — MUPIROCIN CALCIUM 1 APPLICATION(S): 20 CREAM TOPICAL at 06:17

## 2024-01-01 RX ADMIN — MIDODRINE HYDROCHLORIDE 5 MILLIGRAM(S): 2.5 TABLET ORAL at 02:00

## 2024-01-01 RX ADMIN — PANTOPRAZOLE SODIUM 40 MILLIGRAM(S): 20 TABLET, DELAYED RELEASE ORAL at 17:47

## 2024-01-01 RX ADMIN — Medication 50 MILLIGRAM(S): at 13:19

## 2024-01-01 RX ADMIN — HEPARIN SODIUM 5000 UNIT(S): 1000 INJECTION, SOLUTION INTRAVENOUS; SUBCUTANEOUS at 18:08

## 2024-01-01 RX ADMIN — REMDESIVIR 200 MILLIGRAM(S): 100 INJECTION, POWDER, LYOPHILIZED, FOR SOLUTION INTRAVENOUS at 18:08

## 2024-01-01 RX ADMIN — CHLORHEXIDINE GLUCONATE 1 APPLICATION(S): 500 CLOTH TOPICAL at 12:42

## 2024-01-01 RX ADMIN — Medication 500 MILLIGRAM(S): at 12:07

## 2024-01-01 RX ADMIN — MIDODRINE HYDROCHLORIDE 10 MILLIGRAM(S): 2.5 TABLET ORAL at 04:57

## 2024-01-01 RX ADMIN — Medication 50 MILLIGRAM(S): at 13:04

## 2024-01-01 RX ADMIN — HEPARIN SODIUM 5000 UNIT(S): 1000 INJECTION, SOLUTION INTRAVENOUS; SUBCUTANEOUS at 06:14

## 2024-01-01 RX ADMIN — REMDESIVIR 200 MILLIGRAM(S): 100 INJECTION, POWDER, LYOPHILIZED, FOR SOLUTION INTRAVENOUS at 17:01

## 2024-01-01 RX ADMIN — MUPIROCIN CALCIUM 1 APPLICATION(S): 20 CREAM TOPICAL at 17:06

## 2024-01-01 RX ADMIN — Medication 81 MILLIGRAM(S): at 12:43

## 2024-01-01 RX ADMIN — PANTOPRAZOLE SODIUM 40 MILLIGRAM(S): 20 TABLET, DELAYED RELEASE ORAL at 05:39

## 2024-01-01 RX ADMIN — PANTOPRAZOLE SODIUM 40 MILLIGRAM(S): 20 TABLET, DELAYED RELEASE ORAL at 06:09

## 2024-01-01 RX ADMIN — IPRATROPIUM BROMIDE AND ALBUTEROL SULFATE 3 MILLILITER(S): 2.5; .5 SOLUTION RESPIRATORY (INHALATION) at 12:06

## 2024-01-01 RX ADMIN — Medication 100 MILLIGRAM(S): at 12:54

## 2024-01-01 RX ADMIN — MIDODRINE HYDROCHLORIDE 10 MILLIGRAM(S): 2.5 TABLET ORAL at 20:37

## 2024-01-01 RX ADMIN — DEXTROSE MONOHYDRATE, SODIUM CHLORIDE, SODIUM LACTATE, CALCIUM CHLORIDE, MAGNESIUM CHLORIDE 500 MILLILITER(S): 1.5; 538; 448; 18.4; 5.08 SOLUTION INTRAPERITONEAL at 12:06

## 2024-01-01 RX ADMIN — Medication 81 MILLIGRAM(S): at 10:48

## 2024-01-01 RX ADMIN — Medication 50 MILLIGRAM(S): at 13:07

## 2024-01-01 RX ADMIN — MIDODRINE HYDROCHLORIDE 10 MILLIGRAM(S): 2.5 TABLET ORAL at 12:06

## 2024-01-01 RX ADMIN — MUPIROCIN CALCIUM 1 APPLICATION(S): 20 CREAM TOPICAL at 04:57

## 2024-01-01 RX ADMIN — Medication 1 TABLET(S): at 12:07

## 2024-01-01 RX ADMIN — PIPERACILLIN SODIUM, TAZOBACTAM SODIUM 25 GRAM(S): 3; .375 INJECTION, POWDER, LYOPHILIZED, FOR SOLUTION INTRAVENOUS at 04:58

## 2024-01-01 RX ADMIN — Medication 1 TABLET(S): at 12:43

## 2024-01-01 RX ADMIN — MIDODRINE HYDROCHLORIDE 10 MILLIGRAM(S): 2.5 TABLET ORAL at 20:39

## 2024-01-01 RX ADMIN — Medication 0.4 MILLIGRAM(S): at 22:33

## 2024-01-01 RX ADMIN — Medication 1 MILLIGRAM(S): at 13:06

## 2024-01-01 RX ADMIN — Medication 500 MILLIGRAM(S): at 13:06

## 2024-01-01 RX ADMIN — Medication 550 MILLIGRAM(S): at 13:24

## 2024-01-01 RX ADMIN — PIPERACILLIN SODIUM, TAZOBACTAM SODIUM 25 GRAM(S): 3; .375 INJECTION, POWDER, LYOPHILIZED, FOR SOLUTION INTRAVENOUS at 05:01

## 2024-01-01 RX ADMIN — MUPIROCIN CALCIUM 1 APPLICATION(S): 20 CREAM TOPICAL at 17:47

## 2024-01-01 RX ADMIN — IPRATROPIUM BROMIDE AND ALBUTEROL SULFATE 3 MILLILITER(S): 2.5; .5 SOLUTION RESPIRATORY (INHALATION) at 04:57

## 2024-01-01 RX ADMIN — REMDESIVIR 200 MILLIGRAM(S): 100 INJECTION, POWDER, LYOPHILIZED, FOR SOLUTION INTRAVENOUS at 17:47

## 2024-01-01 RX ADMIN — Medication 81 MILLIGRAM(S): at 12:07

## 2024-01-01 RX ADMIN — MUPIROCIN CALCIUM 1 APPLICATION(S): 20 CREAM TOPICAL at 19:11

## 2024-01-01 RX ADMIN — PANTOPRAZOLE SODIUM 40 MILLIGRAM(S): 20 TABLET, DELAYED RELEASE ORAL at 18:23

## 2024-01-01 RX ADMIN — MIDODRINE HYDROCHLORIDE 10 MILLIGRAM(S): 2.5 TABLET ORAL at 21:41

## 2024-01-01 RX ADMIN — Medication 1 MILLIGRAM(S): at 12:54

## 2024-01-01 RX ADMIN — IPRATROPIUM BROMIDE AND ALBUTEROL SULFATE 3 MILLILITER(S): 2.5; .5 SOLUTION RESPIRATORY (INHALATION) at 13:06

## 2024-01-01 RX ADMIN — Medication 1 APPLICATION(S): at 20:38

## 2024-01-01 RX ADMIN — Medication 50 MILLIGRAM(S): at 20:38

## 2024-01-01 RX ADMIN — Medication 50 MILLIGRAM(S): at 04:57

## 2024-01-01 RX ADMIN — Medication 1 APPLICATION(S): at 21:39

## 2024-01-01 RX ADMIN — DEXTROSE MONOHYDRATE, SODIUM CHLORIDE, SODIUM LACTATE, CALCIUM CHLORIDE, MAGNESIUM CHLORIDE 100 MILLILITER(S): 1.5; 538; 448; 18.4; 5.08 SOLUTION INTRAPERITONEAL at 09:39

## 2024-01-01 RX ADMIN — Medication 1 TABLET(S): at 12:26

## 2024-01-01 RX ADMIN — Medication 100 MILLIGRAM(S): at 12:43

## 2024-01-01 RX ADMIN — DEXTROSE MONOHYDRATE, SODIUM CHLORIDE, SODIUM LACTATE, CALCIUM CHLORIDE, MAGNESIUM CHLORIDE 1150 MILLILITER(S): 1.5; 538; 448; 18.4; 5.08 SOLUTION INTRAPERITONEAL at 11:05

## 2024-01-01 RX ADMIN — MIDODRINE HYDROCHLORIDE 10 MILLIGRAM(S): 2.5 TABLET ORAL at 13:19

## 2024-01-01 RX ADMIN — CHLORHEXIDINE GLUCONATE 1 APPLICATION(S): 500 CLOTH TOPICAL at 10:49

## 2024-01-01 RX ADMIN — PIPERACILLIN SODIUM, TAZOBACTAM SODIUM 200 GRAM(S): 3; .375 INJECTION, POWDER, LYOPHILIZED, FOR SOLUTION INTRAVENOUS at 12:58

## 2024-01-01 RX ADMIN — Medication 1 APPLICATION(S): at 21:06

## 2024-01-01 RX ADMIN — Medication 20 MILLIGRAM(S): at 20:40

## 2024-01-01 RX ADMIN — Medication 100 MILLIGRAM(S): at 12:06

## 2024-07-22 NOTE — ED ADULT NURSE NOTE - NSICDXPASTSURGICALHX_GEN_ALL_CORE_FT
PAST SURGICAL HISTORY:  Arm Injury two  repairs on humerus , 4/10 and 8/10    Excision of cataract od pt reports five surgeries after cataract pexcision last 2001    Excision of cataract os 1990    Left Humerus Fracture s/p ORIF 4/12/10    Status Post Eye Surgery glaucoma surgery 2003

## 2024-07-22 NOTE — ED PROVIDER NOTE - ATTENDING CONTRIBUTION TO CARE
89M hx htn, hld, dementia, bedbound, minimally verbal, baseline disoriented pw ftt  on exam, frail, cachectic, contracted  likely sepsis  plan for abx, fluids, labs  dni/dnr  I read ekg as sinus tach reate 122, pvcs, inferior qs, lateral qs and twi.

## 2024-07-22 NOTE — ED PROVIDER NOTE - CLINICAL SUMMARY MEDICAL DECISION MAKING FREE TEXT BOX
89-year-old male history of hypertension, hyperlipidemia, CAD status post stents, BPH, bedbound, minimally verbal at baseline, response due to worsening mental status for 1 day.  Per son, patient only crying out in pain and appears more disoriented.  Also endorsing normal p.o. intake.  Unable to obtain history from patient.    Patient febrile rectally to 100.5, tachycardic, systolics 90s. Severely cachectic appearing,  lungs clear, abdomen nontender, contracted extremities.  Likely infectious etiology of altered mental status, possible electrolyte derangement to given poor p.o. intake and cachectic appearing.  Will obtain labs, fluids, empiric antibiotics, to be admitted.

## 2024-07-22 NOTE — ED ADULT NURSE NOTE - OBJECTIVE STATEMENT
89y Male PMHx HTN, HLD, CAD w/ stents, and BPH presenting to ED via walk-in triage for AMS. As per son at bedside, pt has been bed   89-year-old male history of hypertension, hyperlipidemia, CAD status post stents, BPH, bedbound, minimally verbal at baseline, response due to worsening mental status for 1 day.  Per son, patient only crying out in pain and appears more disoriented.  Also endorsing normal p.o. intake.  Unable to obtain history from patient.    Patient febrile rectally to 100.5, tachycardic, systolics 90s. Severely cachectic appearing,  lungs clear, abdomen nontender, contracted extremities.  Likely infectious etiology of altered mental status, possible electrolyte derangement to given poor p.o. intake and cachectic appearing.  Will obtain labs, fluids, empiric antibiotics, to be admitted. 89y Male PMHx HTN, HLD, CAD w/ stents, and BPH presenting to ED via walk-in triage for AMS. As per son at bedside, pt has been bed   89-year-old male history of hypertension, hyperlipidemia, CAD status post stents, BPH, bedbound, minimally verbal at baseline, response due to worsening mental status for 1 day.  Per son, patient only crying out in pain and appears more disoriented.  Also endorsing normal p.o. intake.  Unable to obtain history from patient. IV placed, labs drawn and sent, seen and marcellus l by MD.

## 2024-07-22 NOTE — PATIENT PROFILE ADULT - DO YOU EVER NEED HELP READING HOSPITAL MATERIALS?
Progress Note( Dr. Roel Cabrrea)  3/13/2022  Subjective:   Admit Date: 2/5/2022  PCP: No primary care provider on file. Admitted For :Patient was admitted on 2/5/2022 over a month ago. Admitted she was Covid positive COVID with Acute Pneumonia no out of Covid unit    Consulted For: Better control of blood glucose    Interval History: Feels better very hard of hearing    Denies any chest pains,   Denies SOB . Denies nausea or vomiting. No new bowel or bladder symptoms. No intake or output data in the 24 hours ending 03/13/22 1612    DATA    CBC:   Recent Labs     03/11/22  0305   WBC 7.1   HGB 11.5*   PLT 97*    CMP:  Recent Labs     03/11/22  0305      K 4.3   CL 99   CO2 26   BUN 22   CREATININE 0.5*   CALCIUM 8.2*   PROT 5.1*   LABALBU 3.1*   BILITOT 0.3   ALKPHOS 54   AST 23   ALT 56*     Lipids: No results found for: CHOL, HDL, TRIG  Glucose:  Recent Labs     03/12/22  2101 03/13/22  0653 03/13/22  1204   POCGLU 164* 227* 227*     [id number]:  Lab Results   Component Value Date    LABA1C 6.6 03/11/2022     High Sensitivity TSH:   Lab Results   Component Value Date    TSHHS 0.436 02/28/2022     Free T3: No results found for: FT3  Free T4:No results found for: T4FREE    XR CHEST PORTABLE   Final Result   Patchy airspace opacities bilaterally, may be related to mild pulmonary edema   versus pneumonia. CTA PULMONARY W CONTRAST   Final Result   1. Motion degraded exam.  No evidence for central pulmonary embolism. 2.  Patchy bilateral airspace disease again demonstrated, consistent with   history of COVID infection. Overall these appear less confluent since the   prior chest CT exam.         XR CHEST PORTABLE   Final Result   Bilateral airspace disease, not significantly changed from prior exam.         XR CHEST PORTABLE   Final Result   Stable bilateral pulmonary opacities consistent with COVID pneumonia. Follow   up to resolution is suggested.          XR CHEST PORTABLE   Final Result Patchy bilateral airspace opacities compatible with COVID pneumonia overall   improved from 02/05/2022. CT CHEST PULMONARY EMBOLISM W CONTRAST   Final Result   1. No evidence of pulmonary embolism. 2.  Patchy bilateral consolidative ground-glass opacities are noted, for   which multifocal inflammatory process or infection including COVID pneumonia   should be considered. VL DUP LOWER EXTREMITY VENOUS BILATERAL   Final Result   No evidence of DVT in either lower extremity.          XR CHEST PORTABLE   Final Result   Bilateral airspace opacities, right greater than left, concerning for   extensive multifocal pneumonia              Scheduled Medicines   Medications:    albuterol sulfate HFA  2 puff Inhalation BID    insulin lispro  0-6 Units SubCUTAneous TID WC    insulin lispro  0-3 Units SubCUTAneous Nightly    methylPREDNISolone  40 mg IntraVENous Q8H    guaiFENesin  200 mg Oral Q4H    vitamin B-12  1,000 mcg Oral Daily    traZODone  25 mg Oral Nightly    lubiprostone  8 mcg Oral BID WC    metoprolol tartrate  12.5 mg Oral BID    polyethylene glycol  17 g Oral Daily    enoxaparin  30 mg SubCUTAneous BID    fluticasone  1 spray Each Nostril Daily    morphine  30 mg Oral 2 times per day    cetirizine  10 mg Oral Daily    [Held by provider] losartan  50 mg Oral Daily    sodium chloride flush  5-40 mL IntraVENous 2 times per day    sennosides-docusate sodium  1 tablet Oral BID    famotidine  20 mg Oral BID    Vitamin D  2,000 Units Oral Daily      Infusions:    sodium chloride 25 mL (02/09/22 0917)         Objective:   Vitals: BP (!) 115/97   Pulse 77   Temp 98 °F (36.7 °C)   Resp 27   Ht 5' 2.01\" (1.575 m)   Wt 199 lb 12.8 oz (90.6 kg)   SpO2 90%   BMI 36.53 kg/m²   General appearance: alert and cooperative with exam  Neck: no JVD or bruit  Thyroid : Normal lobes   Lungs: Has Vesicular Breath sounds   Heart:  regular rate and rhythm  Abdomen: soft, non-tender; bowel sounds normal; no masses,  no organomegaly  Musculoskeletal: Normal  Extremities: extremities normal, , no edema  Neurologic:  Awake, alert, oriented to name, place and time. Cranial nerves II-XII are grossly intact. Motor is  intact. Sensory is intact. ,  and gait is normal.    Assessment:     Patient Active Problem List:     WD-Venous stasis dermatitis of both lower extremities     WD-Lymphedema of both lower extremities     WD-Venous stasis ulcer of right calf with fat layer exposed with varicose veins (HCC)     WD-Venous stasis ulcer of left calf with fat layer exposed with varicose veins (HCC)     COVID-19     Hypoxia     SVT (supraventricular tachycardia) (HCC)     Adjustment disorder with anxious mood      Plan:     1. Reviewed POC blood glucose . Labs and X ray results   2. Reviewed Current Medicines   3. On Correction bolus Humalog/ Basal Lantus Insulin regime  4. Monitor Blood glucose frequently   5. Modified  the dose of Insulin/ other medicines as needed   6. Will follow     .      Everton Garcia MD, MD no

## 2024-07-22 NOTE — PATIENT PROFILE ADULT - FALL HARM RISK - HARM RISK INTERVENTIONS
Assistance with ambulation/Assistance OOB with selected safe patient handling equipment/Communicate Risk of Fall with Harm to all staff/Discuss with provider need for PT consult/Monitor gait and stability/Reinforce activity limits and safety measures with patient and family/Tailored Fall Risk Interventions/Visual Cue: Yellow wristband and red socks/Bed in lowest position, wheels locked, appropriate side rails in place/Call bell, personal items and telephone in reach/Instruct patient to call for assistance before getting out of bed or chair/Non-slip footwear when patient is out of bed/[place] to call system/Physically safe environment - no spills, clutter or unnecessary equipment/Purposeful Proactive Rounding/Room/bathroom lighting operational, light cord in reach

## 2024-07-22 NOTE — ED ADULT NURSE REASSESSMENT NOTE - NS ED NURSE REASSESS COMMENT FT1
Indwelling Patiño catheter placed as per MD order; 2 RNs at bedside during insertion; sterile technique utilized and maintained. Catheter securement device placed, catheter draining to gravity, 15cc dark yellow urine drained. Pt tolerated well.

## 2024-07-22 NOTE — CONSULT NOTE ADULT - SUBJECTIVE AND OBJECTIVE BOX
Dr. Martin  Office [phone] (9 am to 5 pm)  Service : [phone] ( 5pm to 9 am)    Liyah THOMAS      RENAL INITIAL CONSULT NOTE: DATE OF SERVICE 24 @ 14:56    HPI:  Patient is a 89 year old male with a PMHx of HTN, HLD, CAD S/P stents, Dementia, Type II DM, BPH, bedbound, minimally verbal at baseline who presents to [facility] accompanied by his son. History obtained from Son at the bedside as patient with dementia and AxO X 0. Per son, patient has been only consuming ensures for 3 months now, no other oral intake. Per son, patient brought in due to worsening mental status X 1 day, and appears more disoriented. In the ER, patient was found to be severely dehydration and febrile to 100.5. WE got involved for hypernatremia and renal failure      Allergies:  No Known Allergies      PAST MEDICAL & SURGICAL HISTORY:  HTN (Hypertension)        Hypercholesterolemia      Gout      GERD (Gastroesophageal Reflux Disease)      Cataract  os done , od done approximately  with complications son reports 5 surgeries to follow last  pt is legally blind right eye      Glaucoma  Dx'd       Accidental Fall  pt fell 1/10, pt does not remember fall, fractured humerus left arm      Arthritis      BPH (benign prostatic hypertrophy)      Slow transit constipation      Type 2 diabetes mellitus without complication      Bilateral dry eyes      Insomnia      Excision of cataract os        Excision of cataract od  pt reports five surgeries after cataract pexcision last       Left Humerus Fracture s/p ORIF  4/12/10      Status Post Eye Surgery  glaucoma surgery       Arm Injury  two  repairs on humerus , 4/10 and 8/10          Home Medications Reviewed    Hospital Medications:   MEDICATIONS  (STANDING):  cefTRIAXone   IVPB 1000 milliGRAM(s) IV Intermittent every 24 hours  heparin   Injectable 5000 Unit(s) SubCutaneous every 12 hours  lactated ringers. 1000 milliLiter(s) (100 mL/Hr) IV Continuous <Continuous>      SOCIAL HISTORY:  Denies ETOh, Smoking,     FAMILY HISTORY:      REVIEW OF SYSTEMS:  could not be obtained, not communicating    VITALS:  T(F): 98.1 (24 @ 13:48), Max: 98.8 (24 @ 10:44)  HR: 92 (24 @ 13:48)  BP: 128/74 (24 @ 13:48)  RR: 18 (24 @ 13:48)  SpO2: 100% (24 @ 13:48)  Wt(kg): --    Height (cm): 165.1 ( @ 10:44)  Weight (kg): 36.3 ( @ 10:44)  BMI (kg/m2): 13.3 ( @ 10:44)  BSA (m2): 1.34 ( @ 10:44)    PHYSICAL EXAM:  Constitutional: NAD  HEENT: anicteric sclera, oropharynx clear, MMM  Neck: No JVD  Respiratory: CTAB, no wheezes, rales or rhonchi  Cardiovascular: S1, S2, RRR  Gastrointestinal: BS+, soft, NT/ND  Extremities: No cyanosis or clubbing. No peripheral edema  Neurological: Awake, doesn't follow command  : No CVA tenderness. + christianson.   Skin: No rashes, dry, poor turgor       LABS:      162<HH>  |  120<H>  |  100<H>  ----------------------------<  169<H>  4.5   |  25  |  3.30<H>    Ca    12.4<H>      2024 11:20    TPro  6.9  /  Alb  3.1<L>  /  TBili  0.6  /  DBili      /  AST  33  /  ALT  13  /  AlkPhos  79      Creatinine Trend: 3.30 <--                        11.2   10.62 )-----------( 118      ( 2024 11:20 )             37.8     Urine Studies:  Urinalysis Basic - ( 2024 13:09 )    Color: Yellow / Appearance: Cloudy / S.020 / pH:   Gluc:  / Ketone: Negative mg/dL  / Bili: Negative / Urobili: 1.0 mg/dL   Blood:  / Protein: 100 mg/dL / Nitrite: Negative   Leuk Esterase: Trace / RBC: 44 /HPF / WBC 9 /HPF   Sq Epi:  / Non Sq Epi: 2 /HPF / Bacteria: Negative /HPF          RADIOLOGY & ADDITIONAL STUDIES:

## 2024-07-22 NOTE — H&P ADULT - ASSESSMENT
Patient is a 89 year old male with a PMHx of HTN, HLD, CAD S/P stents, Dementia, Type II DM, BPH, bedbound, minimally verbal at baseline who presents to [facility] accompanied by his son. History obtained from Son at the bedside as patient with dementia and AxO X 0. Per son, patient has been only consuming ensures for 3 months now, no other oral intake. Per son, patient brought in due to worsening mental status X 1 day, and appears more disoriented. In the ER, patient was found to be severely dehydration and febrile to 100.5.     SEPSIS --> ? Due to UTI   - Pt febrile to 100.5, tachycardic, leukocytosis   - S/P LR and Rocephin in ER  - UA borderline. Check UCx  - F/u BCx2 --> In lab   - C/w Rocephin for ABX for now   - Trend Lactate in AM   - Antipyretics PRN   - Trend CBC, temp curve, VS and monitor patient     Hypernatremia, Severe Dehydration   - Na of 162, Lactate of 4.3  - Started on  CC as per renal   - Trend BNP at 18:00 7/22 and 7/23 AM   - Avoid overcorrection > 6-8 mEq in 24 hours; Trend BMP Q12   - Renal eval appreciated; F/u recs    R/O UTI   - UA borderline, w/ urinary retention   - Check UCx  - F/u BCx2 --> In lab   - C/w Rocephin for ABX for now   - Trend Lactate in AM   - Trend CBC, temp curve, VS and monitor patient     Troponemia  - Likely type II in view of Cr and metabolic discrepancies  - Trend troponin to peak   - Monitor on tele  - Cardio eval     VERONICA, Urinary retention, BPH   - S/P placement of Patiño in ER  - VERONICA likely 2/2 dehydration   - IVF for hydration as above   - Check Renal US    - Trend Cr in AM, avoid nephrotoxic agents    Dysphagia  - Per Son, patient has been dependent on Ensure X 3 months  - Has not tolerated any additional PO intake  - Keep NPO w/ IVF for now  - FS Q6 while NPO   - Son would like to hold off on PEG/ NGT for now     Hypercalcemia  - Likely 2/2 Dehydration   - C/w IVF as per Renal As above  - Trend Ca in Am     Type II DM  - Check A1C  - Hold insulins in view of NPO status  - Check FS Q6 while NPO  - Trend glucose levels    CAD S/P stents  - ASA and Statin     Dementia  - Fall, Aspiration precautions    HTN   - Hold home BP medications  - Monitor BP, VS and adjust as tolerated    HLD  - Check lipid panel      PPX      Discussed with Attending, Son and Renal  Patient is a 89 year old male with a PMHx of HTN, HLD, CAD S/P stents, Dementia, Type II DM, BPH, bedbound, minimally verbal at baseline who presents to [facility] accompanied by his son. History obtained from Son at the bedside as patient with dementia and AxO X 0. Per son, patient has been only consuming ensures for 3 months now, no other oral intake. Per son, patient brought in due to worsening mental status X 1 day, and appears more disoriented. In the ER, patient was found to be severely dehydration and febrile to 100.5.     SEPSIS --> ? Due to UTI   - Pt febrile to 100.5, tachycardic, leukocytosis   - S/P LR and Rocephin in ER  - UA borderline. Check UCx  - F/u BCx2 --> In lab   - C/w Rocephin for ABX for now   - Trend Lactate in AM   - Antipyretics PRN   - Trend CBC, temp curve, VS and monitor patient     Hypernatremia, Severe Dehydration   - Na of 162, Lactate of 4.3  - Started on  CC as per renal   - Trend BNP at 18:00 7/22 and 7/23 AM   - Avoid overcorrection > 6-8 mEq in 24 hours; Trend BMP Q12   - Renal eval appreciated; F/u recs    R/O UTI   - UA borderline, w/ urinary retention   - Check UCx  - F/u BCx2 --> In lab   - C/w Rocephin for ABX for now   - Trend Lactate in AM   - Trend CBC, temp curve, VS and monitor patient     Troponemia  - Likely type II in view of Cr and metabolic discrepancies  - Trend troponin to peak   - Monitor on tele  - Cardio eval     VERONICA, Urinary retention, BPH   - S/P placement of Patiño in ER  - VERONICA likely 2/2 dehydration   - IVF for hydration as above   - Check Renal US    - Trend Cr in AM, avoid nephrotoxic agents    Dysphagia  - Per Son, patient has been dependent on Ensure X 3 months  - Has not tolerated any additional PO intake  - Keep NPO w/ IVF for now  - FS Q6 while NPO   - Son would like to hold off on PEG/ NGT for now     Hypercalcemia  - Likely 2/2 Dehydration   - C/w IVF as per Renal As above  - Trend Ca in Am     Type II DM  - Check A1C  - Hold insulins in view of NPO status  - Check FS Q6 while NPO  - Trend glucose levels    CAD S/P stents  - ASA and Statin   - Monitor on tele     Dementia  - Fall, Aspiration precautions    HTN   - Hold home Diltiazem medications  - C/w Toprol w/ Hold parameters   - Monitor BP, VS and adjust as tolerated    HLD  - Check lipid panel   - Simvastatin     PPX      Discussed with Attending, Son and Renal  Patient is a 89 year old male with a PMHx of HTN, HLD, CAD S/P stents, Dementia, Type II DM, BPH, bedbound, minimally verbal at baseline who presents to [facility] accompanied by his son. History obtained from Son at the bedside as patient with dementia and AxO X 0. Per son, patient has been only consuming ensures for 3 months now, no other oral intake. Per son, patient brought in due to worsening mental status X 1 day, and appears more disoriented. In the ER, patient was found to be severely dehydration and febrile to 100.5.     SEPSIS --> ? Due to UTI   - Pt febrile to 100.5, tachycardic, leukocytosis   - S/P LR and Rocephin in ER  - UA borderline. Check UCx  - F/u BCx2 --> In lab   - C/w Rocephin for ABX for now   - Trend Lactate in AM   - Antipyretics PRN   - Trend CBC, temp curve, VS and monitor patient     Hypernatremia, Severe Dehydration   - Na of 162, Lactate of 4.3  - Started on  CC as per renal   - Trend BNP at 18:00 7/22 and 7/23 AM   - Avoid overcorrection > 6-8 mEq in 24 hours; Trend BMP Q12   - Renal eval appreciated; F/u recs    COVID  - Start RDV, monitor Cr and LFTs  - Hold off on steroids as patient is not hypoxic  - Check LE Duplex  - Heparin for DVT PPX  - Monitor O2 saturation; Supplement to maintain >90%    R/O UTI   - UA borderline, w/ urinary retention   - Check UCx  - F/u BCx2 --> In lab   - C/w Rocephin for ABX for now   - Trend Lactate in AM   - Trend CBC, temp curve, VS and monitor patient     Troponemia  - Likely type II in view of Cr and metabolic discrepancies  - Trend troponin to peak   - Monitor on tele  - Cardio eval     VERONICA, Urinary retention, BPH   - S/P placement of Patiño in ER  - VERONICA likely 2/2 dehydration   - IVF for hydration as above   - Check Renal US    - Trend Cr in AM, avoid nephrotoxic agents    Dysphagia  - Per Son, patient has been dependent on Ensure X 3 months  - Has not tolerated any additional PO intake  - Keep NPO w/ IVF for now  - FS Q6 while NPO   - Son would like to hold off on PEG/ NGT for now     Hypercalcemia  - Likely 2/2 Dehydration   - C/w IVF as per Renal As above  - Trend Ca in Am     Type II DM  - Check A1C  - Hold insulins in view of NPO status  - Check FS Q6 while NPO  - Trend glucose levels    CAD S/P stents  - ASA and Statin   - Monitor on tele     Dementia  - Fall, Aspiration precautions    HTN   - Hold home Diltiazem medications  - C/w Toprol w/ Hold parameters   - Monitor BP, VS and adjust as tolerated    HLD  - Check lipid panel   - Simvastatin     PPX      Discussed with Attending, Son and Renal

## 2024-07-22 NOTE — ED PROVIDER NOTE - PHYSICAL EXAMINATION
GEN: NAD. A&Ox0. Severely cachectic. Screams to painful stimuli  HEENT: normocephalic, atraumatic, right hazy cornea, left pupil round and reactive,, no conjuntival pallor, moist MM  CARDIAC: tachycardic, regular rhythm, S1, S2, no murmur. Radial pulses present and symmetric b/l  PULM: CTA B/L no wheeze, rhonchi, rales.   ABD: soft NT, ND, no rebound no guarding  MSK: Contracted extremities.     NEURO: Unable to fully assess  SKIN: warm, dry, no rash.

## 2024-07-22 NOTE — H&P ADULT - NSHPPHYSICALEXAM_GEN_ALL_CORE
Vital Signs Last 24 Hrs  T(C): 36.7 (22 Jul 2024 13:48), Max: 37.1 (22 Jul 2024 10:44)  T(F): 98.1 (22 Jul 2024 13:48), Max: 98.8 (22 Jul 2024 10:44)  HR: 92 (22 Jul 2024 13:48) (92 - 109)  BP: 128/74 (22 Jul 2024 13:48) (93/61 - 128/74)  BP(mean): 89 (22 Jul 2024 13:48) (89 - 89)  RR: 18 (22 Jul 2024 13:48) (12 - 18)  SpO2: 100% (22 Jul 2024 13:48) (98% - 100%)    Parameters below as of 22 Jul 2024 13:48  Patient On (Oxygen Delivery Method): room air        Appearance: Frail, weak appearing, cachectic male   HEENT:  Dry oral mucosa 	  Lymphatic: No lymphadenopathy grossly   Cardiovascular: Tachycardic   Respiratory: Lungs clear to auscultation   Gastrointestinal:  Soft, Cachectic   Skin: No rashes grossly; + Contracted   Musculoskeletal: + Contracted   Psychiatry:  AxO X 0   Ext: No edema

## 2024-07-22 NOTE — H&P ADULT - HISTORY OF PRESENT ILLNESS
Patient is a 89 year old male with a PMHx of HTN, HLD, CAD S/P stents, Dementia, Type II DM, BPH, bedbound, minimally verbal at baseline who presents to [facility] accompanied by his son. History obtained from Son at the bedside as patient with dementia and AxO X 0. Per son, patient has been only consuming ensures for 3 months now, no other oral intake. Per son, patient brought in due to worsening mental status X 1 day, and appears more disoriented. In the ER, patient was found to be severely dehydration and febrile to 100.5.

## 2024-07-22 NOTE — ED ADULT NURSE NOTE - NSICDXPASTMEDICALHX_GEN_ALL_CORE_FT
PAST MEDICAL HISTORY:  Accidental Fall pt fell 1/10, pt does not remember fall, fractured humerus left arm    Arthritis     Bilateral dry eyes     BPH (benign prostatic hypertrophy)     Cataract os done 1990, od done approximately 1995 with complications son reports 5 surgeries to follow last 2001 pt is legally blind right eye    GERD (Gastroesophageal Reflux Disease)     Glaucoma Dx'd 2000    Gout     HTN (Hypertension) 2000    Hypercholesterolemia     Insomnia     Slow transit constipation     Type 2 diabetes mellitus without complication

## 2024-07-23 NOTE — CONSULT NOTE ADULT - SUBJECTIVE AND OBJECTIVE BOX
CHIEF COMPLAINT:Patient is a 89y old  Male who presents with a chief complaint of Dehydration, Fever (22 Jul 2024 14:55)      HISTORY OF PRESENT ILLNESS:    89 male with HTN, HLD, CAD s/p stents, Dementia, Type II DM, BPH, bedbound, minimally verbal at baseline   admitted with failure to thrive, VERONICA, and acute covid infection   pt appears very frail laying in bed     PAST MEDICAL & SURGICAL HISTORY:  HTN (Hypertension)  2000      Hypercholesterolemia      Gout      GERD (Gastroesophageal Reflux Disease)      Cataract  os done 1990, od done approximately 1995 with complications son reports 5 surgeries to follow last 2001 pt is legally blind right eye      Glaucoma  Dx'd 2000      Accidental Fall  pt fell 1/10, pt does not remember fall, fractured humerus left arm      Arthritis      BPH (benign prostatic hypertrophy)      Slow transit constipation      Type 2 diabetes mellitus without complication      Bilateral dry eyes      Insomnia      Excision of cataract os  1990      Excision of cataract od  pt reports five surgeries after cataract pexcision last 2001      Left Humerus Fracture s/p ORIF  4/12/10      Status Post Eye Surgery  glaucoma surgery 2003      Arm Injury  two  repairs on humerus , 4/10 and 8/10              MEDICATIONS:  aspirin  chewable 81 milliGRAM(s) Oral daily  heparin   Injectable 5000 Unit(s) SubCutaneous every 12 hours  metoprolol tartrate 25 milliGRAM(s) Oral two times a day    cefTRIAXone   IVPB 1000 milliGRAM(s) IV Intermittent every 24 hours  remdesivir  IVPB 100 milliGRAM(s) IV Intermittent every 24 hours  remdesivir  IVPB   IV Intermittent     loratadine 10 milliGRAM(s) Oral daily PRN      pantoprazole    Tablet 40 milliGRAM(s) Oral before breakfast    simvastatin 20 milliGRAM(s) Oral at bedtime    ascorbic acid 500 milliGRAM(s) Oral daily  calcium carbonate 1250 mG  + Vitamin D (OsCal 500 + D) 1 Tablet(s) Oral daily  dextrose 5% + sodium chloride 0.45%. 1000 milliLiter(s) IV Continuous <Continuous>  folic acid 1 milliGRAM(s) Oral daily  multivitamin 1 Tablet(s) Oral daily  petrolatum Ophthalmic Ointment 1 Application(s) Both EYES at bedtime  tamsulosin 0.4 milliGRAM(s) Oral at bedtime      FAMILY HISTORY:      Non-contributory    SOCIAL HISTORY:    No tobacco, drugs or etoh    Allergies    No Known Allergies    Intolerances    	    REVIEW OF SYSTEMS:  as above  The rest of the 14 points ROS reviewed and except above they are unremarkable.        PHYSICAL EXAM:  T(C): 36 (07-23-24 @ 06:14), Max: 38.1 (07-22-24 @ 11:00)  HR: 96 (07-23-24 @ 06:14) (92 - 109)  BP: 128/62 (07-23-24 @ 06:14) (93/61 - 128/74)  RR: 18 (07-23-24 @ 06:14) (12 - 19)  SpO2: 96% (07-23-24 @ 06:14) (94% - 100%)  Wt(kg): --  I&O's Summary    JVP: Normal  Neck: supple  Lung: clear   CV: S1 S2 , Murmur:  Abd: soft  Ext: No edema  neuro: lethargic   Psych: flat affect  Skin: normal      LABS/DATA:    TELEMETRY: 	afib, NSVT     ECG:  	   	  CARDIAC MARKERS:                        236 <<== 07-22-24 @ 19:04                194 <<== 07-22-24 @ 15:02                265 <<== 07-22-24 @ 11:20                              10.5   5.38  )-----------( 112      ( 23 Jul 2024 07:18 )             35.2     07-23    162<HH>  |  125<H>  |  94<H>  ----------------------------<  77  4.3   |  23  |  3.06<H>    Ca    11.5<H>      23 Jul 2024 01:18  Phos  3.1     07-23  Mg     2.2     07-23    TPro  6.9  /  Alb  3.1<L>  /  TBili  0.6  /  DBili  x   /  AST  33  /  ALT  13  /  AlkPhos  79  07-22    proBNP:   Lipid Profile:   HgA1c:   TSH:

## 2024-07-23 NOTE — CONSULT NOTE ADULT - ASSESSMENT
Patient is a 89 year old male with a PMHx of HTN, HLD, CAD S/P stents, Dementia, Type II DM, BPH, bedbound, minimally verbal at baseline who presents to [facility] accompanied by his son. History obtained from Son at the bedside as patient with dementia and AxO X 0. Per son, patient has been only consuming ensures for 3 months now, no other oral intake. Per son, patient brought in due to worsening mental status X 1 day, and appears more disoriented. In the ER, patient was found to be severely dehydration and febrile to 100.5. WE got involved for hypernatremia and renal failure    A/P:  Hypernatremia:  clinically severly dehydrated  start LR 100cc/hr  Get urine na, osmolality  Repeat chemistry at 6PM and call me with result  Na should not change more than 6-8meq/L in 24 hrs    VERONICA:  Likely pre-renal  persistent pre-renal state causing ATN can't be ruled out  s/p christianson in the ER  Monitor I/O  Urine Na, cr, renal US    Hypercalcemia:  likely sec to dehydration  will work up if no improvement    Proteinuria/hematuria:  getting treated for cystitis  repeat UA after the treatment
Critically ill pt with history of cad s/p stents, HTN, Dm II  now with sepsis, covid infection   VERONICA   hypernatremia   hypercalcemia  malnutrition     elevated troponin , NSVT   in setting of sepsis and severe renal failure   on asa  on BB  obtain echo to eval LV / RV function     long standing afib   HR overall stable   check TSH  obtain echo   given severe malnutrition , severe renal failure , frail state and very advance age , risk of spontaneous bleed outweighs benefit for systemic a/c    VERONICA, hypernatremia, hypercalcemia  on IVF  plan as per renal     pt is DNR / DNI     
89 y/o M with PMH of PMH of HTN, HLD, CAD S/P stents, Dementia, Type II DM, BPH, bedbound, minimally verbal at baseline. Presents to [facility] accompanied by his son. Per son, patient has been only consuming ensures for 3 months now, no other oral intake. Per son, patient brought in due to worsening mental status X 1 day, and appears more disoriented. In the ER, patient was found to be severely dehydration and febrile to 100.5. +COVID. CXR grossly clear.

## 2024-07-23 NOTE — CONSULT NOTE ADULT - SUBJECTIVE AND OBJECTIVE BOX
PULMONARY CONSULT    HPI: 89 y/o M with PMH of PMH of HTN, HLD, CAD S/P stents, Dementia, Type II DM, BPH, bedbound, minimally verbal at baseline. Presents to [facility] accompanied by his son. Per son, patient has been only consuming ensures for 3 months now, no other oral intake. Per son, patient brought in due to worsening mental status X 1 day, and appears more disoriented. In the ER, patient was found to be severely dehydration and febrile to 100.5. +COVID.           PAST MEDICAL & SURGICAL HISTORY:  HTN (Hypertension)  Hypercholesterolemia  Gout  GERD (Gastroesophageal Reflux Disease)  Cataract os done 1990, od done approximately 1995 with complications son reports 5 surgeries to follow last 2001 pt is legally blind right eye  Glaucoma  Accidental Fall  Arthritis  BPH (benign prostatic hypertrophy)  Slow transit constipation  Type 2 diabetes mellitus without complication  Bilateral dry eyes  Insomnia  Excision of cataract os 1990  Excision of cataract od pt reports five surgeries after cataract pexcision last 2001  Left Humerus Fracture s/p ORIF 4/12/10  Status Post Eye Surgery glaucoma surgery 2003  Arm Injury two  repairs on humerus , 4/10 and 8/10      Allergies  No Known Allergies      FAMILY HISTORY:    Social history:     Review of Systems:  CONSTITUTIONAL: No fever, chills, or fatigue  EYES: No eye pain, visual disturbances, or discharge  ENMT:  No difficulty hearing, tinnitus, vertigo; No sinus or throat pain  NECK: No pain or stiffness  RESPIRATORY: Per above  CARDIOVASCULAR: No chest pain, palpitations, dizziness, or leg swelling  GASTROINTESTINAL: No abdominal or epigastric pain. No nausea, vomiting, or hematemesis; No diarrhea or constipation. No melena or hematochezia.  GENITOURINARY: No dysuria, frequency, hematuria, or incontinence  NEUROLOGICAL: No headaches, memory loss, loss of strength, numbness, or tremors  SKIN: No itching, burning, rashes, or lesions   MUSCULOSKELETAL: No joint pain or swelling; No muscle, back, or extremity pain  PSYCHIATRIC: No depression, anxiety, mood swings, or difficulty sleeping      Medications:  MEDICATIONS  (STANDING):  ascorbic acid 500 milliGRAM(s) Oral daily  aspirin  chewable 81 milliGRAM(s) Oral daily  cefTRIAXone   IVPB 1000 milliGRAM(s) IV Intermittent every 24 hours  chlorhexidine 2% Cloths 1 Application(s) Topical daily  dextrose 5%. 1000 milliLiter(s) (100 mL/Hr) IV Continuous <Continuous>  folic acid 1 milliGRAM(s) Oral daily  heparin   Injectable 5000 Unit(s) SubCutaneous every 12 hours  metoprolol tartrate 25 milliGRAM(s) Oral two times a day  multivitamin 1 Tablet(s) Oral daily  pantoprazole    Tablet 40 milliGRAM(s) Oral before breakfast  petrolatum Ophthalmic Ointment 1 Application(s) Both EYES at bedtime  remdesivir  IVPB 100 milliGRAM(s) IV Intermittent every 24 hours  remdesivir  IVPB   IV Intermittent   simvastatin 20 milliGRAM(s) Oral at bedtime  tamsulosin 0.4 milliGRAM(s) Oral at bedtime    MEDICATIONS  (PRN):  loratadine 10 milliGRAM(s) Oral daily PRN Allergy            Vital Signs Last 24 Hrs  T(C): 36.7 (23 Jul 2024 11:25), Max: 36.7 (22 Jul 2024 13:48)  T(F): 98.1 (23 Jul 2024 11:25), Max: 98.1 (22 Jul 2024 13:48)  HR: 91 (23 Jul 2024 11:25) (91 - 101)  BP: 109/61 (23 Jul 2024 11:25) (109/61 - 128/74)  BP(mean): 70 (22 Jul 2024 19:30) (70 - 89)  RR: 18 (23 Jul 2024 11:25) (18 - 19)  SpO2: 93% (23 Jul 2024 11:25) (93% - 100%)    Parameters below as of 23 Jul 2024 11:25  Patient On (Oxygen Delivery Method): room air          VBG pH 7.44 07-22 @ 11:00  VBG pCO2 45 07-22 @ 11:00  VBG O2 sat 36.2 07-22 @ 11:00  VBG lactate 4.3 07-22 @ 11:00            LABS:                        10.5   5.38  )-----------( 112      ( 23 Jul 2024 07:18 )             35.2     07-23    162<HH>  |  122<H>  |  95<H>  ----------------------------<  67<L>  4.0   |  25  |  3.11<H>    Ca    11.6<H>      23 Jul 2024 07:18  Phos  3.4     07-23  Mg     2.2     07-23    TPro  6.4  /  Alb  2.8<L>  /  TBili  0.3  /  DBili  x   /  AST  55<H>  /  ALT  18  /  AlkPhos  78  07-23          CAPILLARY BLOOD GLUCOSE      POCT Blood Glucose.: 155 mg/dL (22 Jul 2024 10:53)    PT/INR - ( 22 Jul 2024 11:20 )   PT: 14.8 sec;   INR: 1.42 ratio         PTT - ( 22 Jul 2024 11:20 )  PTT:28.4 sec  Urinalysis Basic - ( 23 Jul 2024 07:18 )    Color: x / Appearance: x / SG: x / pH: x  Gluc: 67 mg/dL / Ketone: x  / Bili: x / Urobili: x   Blood: x / Protein: x / Nitrite: x   Leuk Esterase: x / RBC: x / WBC x   Sq Epi: x / Non Sq Epi: x / Bacteria: x              Physical Examination:    General: No acute distress.      HEENT: Pupils equal, reactive to light.  Symmetric.    PULM: Clear to auscultation bilaterally, no significant sputum production    CVS: S1, S2    ABD: Soft, nondistended, nontender, normoactive bowel sounds, no masses    EXT: No edema, nontender    SKIN: Warm and well perfused, no rashes noted.    NEURO: Alert, oriented, interactive, nonfocal    RADIOLOGY REVIEWED  CXR 7/22/24: grossly clear    PULMONARY CONSULT    HPI: 89 y/o M with PMH of PMH of HTN, HLD, CAD S/P stents, Dementia, Type II DM, BPH, bedbound, minimally verbal at baseline. Presents to [facility] accompanied by his son. Per son, patient has been only consuming ensures for 3 months now, no other oral intake. Per son, patient brought in due to worsening mental status X 1 day, and appears more disoriented. In the ER, patient was found to be severely dehydration and febrile to 100.5. +COVID. CXR grossly clear. No cough, labored breathing per pts son at bedside. ROS unable to be obtained, hx of dementia.           PAST MEDICAL & SURGICAL HISTORY:  HTN (Hypertension)  Hypercholesterolemia  Gout  GERD (Gastroesophageal Reflux Disease)  Cataract os done 1990, od done approximately 1995 with complications son reports 5 surgeries to follow last 2001 pt is legally blind right eye  Glaucoma  Accidental Fall  Arthritis  BPH (benign prostatic hypertrophy)  Slow transit constipation  Type 2 diabetes mellitus without complication  Bilateral dry eyes  Insomnia  Excision of cataract os 1990  Excision of cataract od pt reports five surgeries after cataract pexcision last 2001  Left Humerus Fracture s/p ORIF 4/12/10  Status Post Eye Surgery glaucoma surgery 2003  Arm Injury two  repairs on humerus , 4/10 and 8/10      Allergies  No Known Allergies      FAMILY HISTORY: none contributory     Social history: never a smoker     Review of Systems: ROS unable to be obtained       Medications:  MEDICATIONS  (STANDING):  ascorbic acid 500 milliGRAM(s) Oral daily  aspirin  chewable 81 milliGRAM(s) Oral daily  cefTRIAXone   IVPB 1000 milliGRAM(s) IV Intermittent every 24 hours  chlorhexidine 2% Cloths 1 Application(s) Topical daily  dextrose 5%. 1000 milliLiter(s) (100 mL/Hr) IV Continuous <Continuous>  folic acid 1 milliGRAM(s) Oral daily  heparin   Injectable 5000 Unit(s) SubCutaneous every 12 hours  metoprolol tartrate 25 milliGRAM(s) Oral two times a day  multivitamin 1 Tablet(s) Oral daily  pantoprazole    Tablet 40 milliGRAM(s) Oral before breakfast  petrolatum Ophthalmic Ointment 1 Application(s) Both EYES at bedtime  remdesivir  IVPB 100 milliGRAM(s) IV Intermittent every 24 hours  remdesivir  IVPB   IV Intermittent   simvastatin 20 milliGRAM(s) Oral at bedtime  tamsulosin 0.4 milliGRAM(s) Oral at bedtime    MEDICATIONS  (PRN):  loratadine 10 milliGRAM(s) Oral daily PRN Allergy            Vital Signs Last 24 Hrs  T(C): 36.7 (23 Jul 2024 11:25), Max: 36.7 (22 Jul 2024 13:48)  T(F): 98.1 (23 Jul 2024 11:25), Max: 98.1 (22 Jul 2024 13:48)  HR: 91 (23 Jul 2024 11:25) (91 - 101)  BP: 109/61 (23 Jul 2024 11:25) (109/61 - 128/74)  BP(mean): 70 (22 Jul 2024 19:30) (70 - 89)  RR: 18 (23 Jul 2024 11:25) (18 - 19)  SpO2: 93% (23 Jul 2024 11:25) (93% - 100%)    Parameters below as of 23 Jul 2024 11:25  Patient On (Oxygen Delivery Method): room air          VBG pH 7.44 07-22 @ 11:00  VBG pCO2 45 07-22 @ 11:00  VBG O2 sat 36.2 07-22 @ 11:00  VBG lactate 4.3 07-22 @ 11:00            LABS:                        10.5   5.38  )-----------( 112      ( 23 Jul 2024 07:18 )             35.2     07-23    162<HH>  |  122<H>  |  95<H>  ----------------------------<  67<L>  4.0   |  25  |  3.11<H>    Ca    11.6<H>      23 Jul 2024 07:18  Phos  3.4     07-23  Mg     2.2     07-23    TPro  6.4  /  Alb  2.8<L>  /  TBili  0.3  /  DBili  x   /  AST  55<H>  /  ALT  18  /  AlkPhos  78  07-23          CAPILLARY BLOOD GLUCOSE      POCT Blood Glucose.: 155 mg/dL (22 Jul 2024 10:53)    PT/INR - ( 22 Jul 2024 11:20 )   PT: 14.8 sec;   INR: 1.42 ratio         PTT - ( 22 Jul 2024 11:20 )  PTT:28.4 sec  Urinalysis Basic - ( 23 Jul 2024 07:18 )    Color: x / Appearance: x / SG: x / pH: x  Gluc: 67 mg/dL / Ketone: x  / Bili: x / Urobili: x   Blood: x / Protein: x / Nitrite: x   Leuk Esterase: x / RBC: x / WBC x   Sq Epi: x / Non Sq Epi: x / Bacteria: x              Physical Examination:    General: No acute distress.      HEENT: Pupils equal, reactive to light.  Symmetric.    PULM: Clear to auscultation bilaterally    CVS: S1, S2    ABD: Soft, nondistended, nontender, normoactive bowel sounds, no masses    EXT: No edema, nontender    SKIN: Warm and well perfused, no rashes noted.    NEURO: lethargic, oriented x 0      RADIOLOGY REVIEWED  CXR 7/22/24: grossly clear

## 2024-07-23 NOTE — PROVIDER CONTACT NOTE (CRITICAL VALUE NOTIFICATION) - ACTION/TREATMENT ORDERED:
D5/0.45% NS discontinued. D5% to be replaced infusing at 100ml/hr. Repeat BMP for 4pm. No further interventions at this time.

## 2024-07-23 NOTE — PHARMACOTHERAPY INTERVENTION NOTE - COMMENTS
Performed medication reconciliation and home medication list updated in prescription writer/ outpatient medication review. Medications verified with patient's son.     Home medications:  allopurinol 100 mg oral tablet: orally 2 times a day  ascorbic acid: 500 milligram(s) orally prn  aspirin 81 mg oral tablet: 81 milligram(s) orally once a day  ergocalciferol 1.25 mg (50,000 intl units) oral capsule: 1 cap(s) orally once a week  loratadine 10 mg oral tablet: 1 tab(s) orally once a day  multivitamin: 1 tab(s) orally once a day  nateglinide 60 mg oral tablet: 1 tab(s) orally 3 times a day  omeprazole 20 mg oral delayed release tablet: 1 tab(s) orally once a day  Os-Nicola 500 + D: 1 tab(s) orally once a day  simvastatin 20 mg oral tablet: 1 tab(s) orally once a day (at bedtime)  tamsulosin 0.4 mg oral capsule: 1 cap(s) orally once a day  ursodiol: 300 milligram(s) orally 2 times a day    Per patient's son, no other OTC medications used and no updates to patient's drug allergies.     Tia Kebede, PharmD  PGY-1 Pharmacy Resident  Available on Teams

## 2024-07-23 NOTE — CONSULT NOTE ADULT - PROBLEM SELECTOR RECOMMENDATION 9
+COVID PCR   -Low grade fever on admission   -Remdesivir x 3 days if okay with renal (monitor creatinine, LFTs)  -Pt normoxic, no indication for decadron at this time  -Trend inflammatory markers   -DVT ppx

## 2024-07-24 ENCOUNTER — RESULT REVIEW (OUTPATIENT)
Age: 89
End: 2024-07-24

## 2024-07-24 NOTE — DIETITIAN INITIAL EVALUATION ADULT - SIGNS/SYMPTOMS
<75% intake >1 month, severe muscle/fat depletions  suspected deep tissue injury  deep tissue injuries

## 2024-07-24 NOTE — SWALLOW BEDSIDE ASSESSMENT ADULT - ADDITIONAL RECOMMENDATIONS
Maintain good oral care.    GOAL: Pt will tolerate current recommended diet w/o clinical s/s of aspiration.

## 2024-07-24 NOTE — DIETITIAN INITIAL EVALUATION ADULT - ADD RECOMMEND
1) Recommend continue no therapeutic restrictions, encourage protein-rich foods, defer diet texture/fluid consistency to team   2) Continue micronutrient regimen as ordered pending no medical contraindications  3) Malnutrition sticker placed in chart  4) Monitor nutritional intake, diet tolerance, labs, hydration, GI function, skin integrity and wt trends

## 2024-07-24 NOTE — DIETITIAN INITIAL EVALUATION ADULT - ORAL INTAKE PTA/DIET HISTORY
PTA per pts son  -Intake: pt with poor intake PTA, only drinking ~3 ensures/day x 3 months, is able to chew some food but will spit it out per son, did not follow any therapeutic diets at home    -Chewing/Swallowing: son reporting pt with no issues drinking ensures, but was not able to chew/swallow food for months   -Allergies/Intolerances: confirms NKFA   -Vitamins/Supplements: multivitamin, folic acid, vitamin C, iron, calcium+vitamin D all noted in H&P

## 2024-07-24 NOTE — DIETITIAN INITIAL EVALUATION ADULT - NSFNSGIIOFT_GEN_A_CORE
I&O's Detail    23 Jul 2024 07:01  -  24 Jul 2024 07:00  --------------------------------------------------------  IN:  Total IN: 0 mL    OUT:    Oral Fluid: 0 mL    Voided (mL): 150 mL  Total OUT: 150 mL    Total NET: -150 mL      24 Jul 2024 07:01  -  24 Jul 2024 15:02  --------------------------------------------------------  IN:  Total IN: 0 mL    OUT:    Oral Fluid: 0 mL  Total OUT: 0 mL    Total NET: 0 mL

## 2024-07-24 NOTE — DIETITIAN INITIAL EVALUATION ADULT - NSFNSGIASSESSMENTFT_GEN_A_CORE
Denies N/V and diarrhea/constipation. Last BM 7/22. Not currently ordered for a bowel regimen.  - Ordered for protonix

## 2024-07-24 NOTE — SWALLOW BEDSIDE ASSESSMENT ADULT - COMMENTS
Nephrology consulted-->Hypernatremia: clinically severly dehydrated.  Pulmonology consulted-->COVID+; low grade fever on admission; remdesivir x3 days  Per medicine-->Per Son, patient has been dependent on Ensure X 3 months; Has not tolerated any additional PO intake; Keep NPO w/ IVF for now pending S+S eval.    Imagin/22: CXR: No focal consolidation.    Swallow Hx: Pt seen for bedside swallow evaluation during admission in , w/ rx for soft diet.

## 2024-07-24 NOTE — SWALLOW BEDSIDE ASSESSMENT ADULT - SLP PERTINENT HISTORY OF CURRENT PROBLEM
89yoM w/ PMHx of HTN, HLD, CAD S/P stents, Dementia, Type II DM, BPH, bedbound, minimally verbal at baseline who presents to [facility] accompanied by his son. History obtained from Son at the bedside as patient with dementia and AxO X 0. Per son, patient has been only consuming ensures for 3 months now, no other oral intake. Per son, patient brought in due to worsening mental status X 1 day, and appears more disoriented. In the ER, patient was found to be severely dehydration and febrile to 100.5.

## 2024-07-24 NOTE — CHART NOTE - NSCHARTNOTEFT_GEN_A_CORE
HPI:  Patient is a 89 year old male with a PMHx of HTN, HLD, CAD S/P stents, Dementia, Type II DM, BPH, bedbound, minimally verbal at baseline who presents to [facility] accompanied by his son. History obtained from Son at the bedside as patient with dementia and AxO X 0. Per son, patient has been only consuming ensures for 3 months now, no other oral intake. Per son, patient brought in due to worsening mental status X 1 day, and appears more disoriented. In the ER, patient was found to be severely dehydration and febrile to 100.5.  (22 Jul 2024 14:20)      Notified by RN pt hypotensive 70s/40s. RRT subsequently called. Pt received 500 cc IVF bolus, BP improved to 91/54. Son (HCP) at bedside and confirmed DNR/DNI status, stating he is ok with IV fluids and does not want vasopressors. Discussed w/ medicine attending Dr. Nunez and will start on midodrine 10 mg TID. No palliative consult as family does not want hospice. Will continue to monitor.     Vital Signs Last 24 Hrs  T(C): 36.7 (24 Jul 2024 11:10), Max: 36.9 (23 Jul 2024 20:37)  T(F): 98 (24 Jul 2024 11:10), Max: 98.4 (23 Jul 2024 20:37)  HR: 84 (24 Jul 2024 18:17) (73 - 98)  BP: 91/46 (24 Jul 2024 18:17) (76/44 - 126/58)  BP(mean): --  RR: 19 (24 Jul 2024 11:10) (19 - 19)  SpO2: 94% (24 Jul 2024 11:10) (94% - 94%)    Parameters below as of 24 Jul 2024 11:10  Patient On (Oxygen Delivery Method): room air

## 2024-07-24 NOTE — DIETITIAN INITIAL EVALUATION ADULT - NSFNSPHYEXAMSKINFT_GEN_A_CORE
Pressure Injury 1: sacrum, Suspected deep tissue injury  Pressure Injury 2: none, none  Pressure Injury 3: none, none  Pressure Injury 4: none, none  Pressure Injury 5: none, none  Pressure Injury 6: none, none  Pressure Injury 7: none, none  Pressure Injury 8: none, none  Pressure Injury 9: none, none  Pressure Injury 10: none, none  Pressure Injury 11: none, none    *Wound care consult placed Per wound care (7/24), pt with sacrum/bilateral buttocks deep tissue injury and thoracic spine deep tissue injury

## 2024-07-24 NOTE — ADVANCED PRACTICE NURSE CONSULT - REASON FOR CONSULT
Consult to assess patient skin initiated by RN for sacrum deep tissue injury present on admission.    Reason for Admission: Dehydration, Fever  History of Present Illness:   Patient is a 89 year old male with a PMHx of HTN, HLD, CAD S/P stents, Dementia, Type II DM, BPH, bedbound, minimally verbal at baseline who presents to [facility] accompanied by his son. History obtained from Son at the bedside as patient with dementia and AxO X 0. Per son, patient has been only consuming ensures for 3 months now, no other oral intake. Per son, patient brought in due to worsening mental status X 1 day, and appears more disoriented. In the ER, patient was found to be severely dehydration and febrile to 100.5.

## 2024-07-24 NOTE — DIETITIAN INITIAL EVALUATION ADULT - ENERGY INTAKE
Pt was NPO day 3 at time of RD visit this morning, now s/p swallow evaluation and diet ordered (see below). Son would like for pt to receive ensures in-house, per chart son would like to hold off on PEG/NGT for now, though pt with only intake of ~1050kcal and 60g pro/day (using ensure plus high protein as reference, unclear which ensure shake pt was drinking at home).

## 2024-07-24 NOTE — SWALLOW BEDSIDE ASSESSMENT ADULT - SLP GENERAL OBSERVATIONS
Pt encountered in bed, awake, alert, on room air, verbal output limited to intermittent moaning upon attempts to reposition (severely contracted). Pt's son at bedside, who was available to provide translation as needed, as pt is Farsi speaking. Per son, pt has only been drinking Ensure recently. Son reports pt with occasional spitting out of food (toast). No reports of swallowing difficulty per son. Pt unable to follow commands.

## 2024-07-24 NOTE — SWALLOW BEDSIDE ASSESSMENT ADULT - SWALLOW EVAL: PROGNOSIS
Dx cont: D/w pt's son re: risks of aspiration (increased oxygen requirements, intubation, even death) given progression of dementia impacting overall PO intake; son accepting risks of aspiration and expressed wish for pt to remain eating/drinking. This service will follow as indicated.

## 2024-07-24 NOTE — RAPID RESPONSE TEAM SUMMARY - NSSITUATIONBACKGROUNDRRT_GEN_ALL_CORE
Patient is a 89 year old male with a PMHx of HTN, HLD, CAD S/P stents, Dementia, Type II DM, BPH, bedbound, minimally verbal at baseline who is admitted for failure to thrive and sepsis secondary to a UTI, and COVID. Patient found to have hypernatremia, hypercalcemia, acute kidney injury and several other concomitant conditions.    RRT called for hypotension to 70s/40s. Upon arrival of rapid response team, patient profoundly cachectic, screaming to painful stimuli. VS notable for BP 78/44, HR 80-90s (seemingly afib on bedside cardiac monitor), temp 97.6, SpO2 96% on room air, . On exam, significant cachexia, temporal wasting, poor mental status, AOx0, awake and alert, but largely non interactive, contracted, and screaming loudly to any stimuli including placement of BP cuff. Coarse breath sounds noted in R upper/middle lobe. Appears hypovolemic - dec skin turgor, no appreciable JVD or edema, sluggish capillary refill.    Patient already receiving maintenance IV fluids (D5 @ 100cc/hr) but appears to have some extravasation into the R forearm. New IV access obtained by [facility] team, repeat BMP and VBG with lactate drawn. Patient given 500 cc of LR, augmented by pressure bag device. Patient with improvement in BP to 85-95/40-60s (MAP 62-67) after approx 100-200 cc of IVF.     Patient's current hypotension likely driven by multiple factors including sepsis and profound hypovolemic state in the setting of failure to thrive. GOC broached with son (who is the designated HCP) at bedside who confirmed DNR/DNI status and reported that he would like to limit aggressive interventions and "does not want his father to suffer". As such he is ok with IV fluids but would like to forego vasopressors.    The following discussions were discussed with the primary team at bedside at the conclusion of the RRT:  - follow up repeat metabolic panel and VBG with lactate  - continued GOC with family, possible palliative consult as patient's family now seems to be beginning to come to terms with the patient's poor prognosis and likely decreased functional state that is now unfortunately beyond recovery  - continue tx of UTI and COVID

## 2024-07-24 NOTE — SWALLOW BEDSIDE ASSESSMENT ADULT - ASPIRATION PRECAUTIONS
Monitor for s/s aspiration/laryngeal penetration. If noted:  D/C p.o. intake, provide non-oral nutrition/hydration/meds, and contact this service @ [id number]

## 2024-07-24 NOTE — SWALLOW BEDSIDE ASSESSMENT ADULT - SWALLOW EVAL: DIAGNOSIS
89yoM w/ PMHx of dementia, bedbound, minimally verbal, p/w worsening mental status X 1 day, found to be severely dehydrated and febrile, CXR w/o focal consolidation. Pt p/w evidence of a likely acute on chronic oropharyngeal dysphagia iso dementia dx and COVID. Oral phase notable for reduced oral grading w/ behaviors intermittently impacting acceptance of PO, reduced ability to extract liquid from straw (improved over progression of trials), decreased A-P movement of the bolus, delayed oral transit time, and suspected uncontrolled loss w/ thin liquids. Solids not trialed iso current mentation and dentition status. Pharyngeal phase notable for suspected delayed pharyngeal swallow trigger and decreased hyolaryngeal elevation upon palpation. x1 cough post oral intake of thin liquid via straw, not duplicated across multiple trials offered.

## 2024-07-24 NOTE — DIETITIAN INITIAL EVALUATION ADULT - PERTINENT MEDS FT
MEDICATIONS  (STANDING):  ascorbic acid 500 milliGRAM(s) Oral daily  cefTRIAXone   IVPB 1000 milliGRAM(s) IV Intermittent every 24 hours  chlorhexidine 2% Cloths 1 Application(s) Topical daily  dextrose 5%. 1000 milliLiter(s) (100 mL/Hr) IV Continuous <Continuous>  folic acid 1 milliGRAM(s) Oral daily  metoprolol tartrate 25 milliGRAM(s) Oral two times a day  multivitamin 1 Tablet(s) Oral daily  mupirocin 2% Nasal 1 Application(s) Both Nostrils two times a day  pantoprazole  Injectable 40 milliGRAM(s) IV Push every 12 hours  petrolatum Ophthalmic Ointment 1 Application(s) Both EYES at bedtime  remdesivir  IVPB 100 milliGRAM(s) IV Intermittent every 24 hours  remdesivir  IVPB   IV Intermittent   simvastatin 20 milliGRAM(s) Oral at bedtime  tamsulosin 0.4 milliGRAM(s) Oral at bedtime    MEDICATIONS  (PRN):  loratadine 10 milliGRAM(s) Oral daily PRN Allergy

## 2024-07-24 NOTE — DIETITIAN INITIAL EVALUATION ADULT - PERTINENT LABORATORY DATA
07-24    151<H>  |  120<H>  |  98<H>  ----------------------------<  57<L>  4.5   |  17<L>  |  2.87<H>    Ca    10.9<H>      24 Jul 2024 07:12  Phos  3.4     07-23  Mg     2.2     07-23    TPro  6.4  /  Alb  2.8<L>  /  TBili  0.3  /  DBili  x   /  AST  55<H>  /  ALT  18  /  AlkPhos  78  07-23  POCT Blood Glucose.: 154 mg/dL (07-24-24 @ 12:18)  A1C with Estimated Average Glucose Result: 5.9 % (07-23-24 @ 07:18)

## 2024-07-24 NOTE — PROVIDER CONTACT NOTE (HYPOGLYCEMIA EVENT) - NS PROVIDER CONTACT BACKGROUND-HYPO
Age: 89y    Gender: Male    POCT Blood Glucose:  76 mg/dL (07-24-24 @ 07:10)  65 mg/dL (07-24-24 @ 06:33)      eMAR:simvastatin   20 milliGRAM(s) Oral (07-23-24 @ 22:33)

## 2024-07-24 NOTE — DIETITIAN INITIAL EVALUATION ADULT - PERSON TAUGHT/METHOD
Provided education on meeting adequate protein-energy needs, emphasized the importance of adequate calorie and protein intake during times of acute illness. Encouraged prioritizing protein foods and consuming adequate amounts of protein at each meal for preservation of lean muscle mass. Obtained food preferences, will honor as able. Made aware RD remains available./verbal instruction/son instructed

## 2024-07-24 NOTE — SWALLOW BEDSIDE ASSESSMENT ADULT - ORAL PHASE
suspected premature spillage Decreased anterior-posterior movement of the bolus/Delayed oral transit time

## 2024-07-24 NOTE — SWALLOW BEDSIDE ASSESSMENT ADULT - POSITIONING
31 degrees; unable to achieve more upright position given significant contraction and visible discomfort upon attempts to reposition

## 2024-07-24 NOTE — DIETITIAN INITIAL EVALUATION ADULT - REASON INDICATOR FOR ASSESSMENT
Consult for MST score of 2 or >  Sources: Medical Record, pt's son at bedside provided subjective information, pt with dementia (a&ox0) unable to participate meaningfully in interview   Chart reviewed, events noted.

## 2024-07-24 NOTE — SWALLOW BEDSIDE ASSESSMENT ADULT - NS SPL SWALLOW CLINIC TRIAL FT
Solids not trialed given pt's son report of pt w/ occasional chewing and spitting out food at home. Additionally, dentition not supportive of chewables.

## 2024-07-24 NOTE — ADVANCED PRACTICE NURSE CONSULT - RECOMMEDATIONS
Impression  urinary incontinence  che rectal incontinence associated dermatitis  bilateral sacrum/buttock deep tissue injury present on admission  Thoracic Spine deep tissue injury present on admission    Recommendations   1. Bilateral sacrum/coccyx/buttock deep tissue injury present on admission      Che rectal incontinence associated dermatitis  Topical therapy- sacral/bilateral buttocks- cleanse w/incontinent cleanser, pat dry  apply  cavilon and allevyn on coccyx. apply david cream  buttocks twice daily & prn soiling . Monitor for changes .  2. Thoracic spine     Cleanse w/ NS, pat dry apply cavilon then alleyvn foam. Monitor for changes.  3.  Elevate heels; apply Complete Cair air fluidized boots; ensure that the soles of the feet are not resting on the foot board of the bed.  4-  Incontinent management - incontinent cleanser, pads,  che care  BID  5. Maintain on an alternating air with low air loss surface   6. Turn & reposition every 2 hr; Use positioning pillow to turn and reposition, soft pillow between bony prominences; continue measures to decrease friction/shear/pressure.  7. Nutrition optimization.  8. Place  waffle cushion when out of bed to chair .   plan of care reviewed with covering staff Virginie Impression  urinary incontinence  che rectal incontinence associated dermatitis  bilateral sacrum/buttock deep tissue injury present on admission  Thoracic Spine deep tissue injury present on admission    Recommendations   1. Bilateral sacrum/coccyx/buttock deep tissue injury present on admission      Che rectal incontinence associated dermatitis  Topical therapy- sacral/bilateral buttocks- cleanse w/incontinent cleanser, pat dry  apply cavilon and allevyn on coccyx. apply david cream  buttocks twice daily & prn soiling . Monitor for changes .  2. Thoracic spine     Cleanse w/ NS, pat dry apply cavilon then alleyvn foam. Monitor for changes.  3.  Elevate heels; apply Complete Cair air fluidized boots; ensure that the soles of the feet are not resting on the foot board of the bed.  4-  Incontinent management - incontinent cleanser, pads,  che care  BID  5. Maintain on an alternating air with low air loss surface   6. Turn & reposition every 2 hr; Use positioning pillow to turn and reposition, soft pillow between bony prominences; continue measures to decrease friction/shear/pressure.  7. Nutrition optimization.  8. Place  waffle cushion when out of bed to chair .   plan of care reviewed with covering staff Virginie

## 2024-07-24 NOTE — DIETITIAN INITIAL EVALUATION ADULT - ETIOLOGY
inability to consume sufficient energy/protein in setting of chewing difficulty/lack of appetite increased physiological demand for nutrients

## 2024-07-24 NOTE — DIETITIAN INITIAL EVALUATION ADULT - NSFNSADHERENCEPTAFT_GEN_A_CORE
Pt with hx of T2DM per chart, son confirmed. Per H&P pt on sliding scale insulin at home. Latest A1C 5.9% (7/23) indicates good glycemic control vs. poor intake PTA. Pt noted with hypoglycemia this AM likely in setting of NPO, ordered for IV dextrose 5%. No anti-diabetes medications ordered thus far.

## 2024-07-24 NOTE — DIETITIAN INITIAL EVALUATION ADULT - OTHER INFO
- Renal: pt noted with severe clinical dehydration, noted with hypernatremia and VERONICA in setting of dehydration, continue to monitor renal electrolytes and replete prn  - Pulmonology: COVID+, ordered for remdesivir  - Swallow evaluation (7/24) with recommendations for puree/thin liquids  - Ordered for multivitamin/vitamin C/folic acid in-house

## 2024-07-24 NOTE — SWALLOW BEDSIDE ASSESSMENT ADULT - PHARYNGEAL PHASE
No appreciable change in vocal quality noted post swallow./Delayed pharyngeal swallow/Decreased laryngeal elevation x1 cough post oral intake; not duplicated across trials./Delayed pharyngeal swallow/Decreased laryngeal elevation

## 2024-07-24 NOTE — DIETITIAN INITIAL EVALUATION ADULT - NS FNS WEIGHT CHANGE REASON
Pts son able to confirm wt loss but unsure pts UBW/CBW, believes likely CBW ~90 pounds.    Current dosing wt in pounds: 80 (7/22)  No daily wts in chart to assess. RD obtained bedscale wt: 70 pounds (7/24)  Last recorded wt in [facility] 160 pounds (10/2015)    Pt with wt loss over time, unclear amount or time frame other than pt with 50% wt loss x 9 years (not clinically significant). RD to continue to monitor wt as able.  IBW: 124 pounds    *Of note, pt dosing height in current chart 65" though per [facility] pt last height 63" - used for IBW/BMI

## 2024-07-24 NOTE — DIETITIAN INITIAL EVALUATION ADULT - OTHER CALCULATIONS
Calculations based on bedscale wt (7/24) with consideration for severe malnutrition and suspected deep tissue injury   - Fluid needs deferred to team  Calculations based on bedscale wt (7/24) with consideration for severe malnutrition and deep tissue injuries  - Fluid needs deferred to team

## 2024-07-24 NOTE — SWALLOW BEDSIDE ASSESSMENT ADULT - ORAL PREPARATORY PHASE
Reduced oral grading; trace anterior loss, though improved with progression of trials. Behavioral component (turning head away, yelling) intermittently impacting acceptance of PO; however, improved with encouragement from clinician and son at bedside. Initial weak labial seal and control to extract bolus from straw; improved as trials progressed.

## 2024-07-24 NOTE — ADVANCED PRACTICE NURSE CONSULT - ASSESSMENT
Arrived on unit, patient was found lying in a low air loss pressure redistribution support surface style bed. The patient  is unable to turn independently and staff assistance x 2 was provided. Patient is very contracted, christianson in place. Once turned,  able to view her skin. Bilateral sacrum/coccyx/buttocks non-blanchable deep red, purple  discoloration and hyperpigmentation  consistent with a deep tissue injury  approximatively 16cm. x 16cm. x 0cm. present on admission.  Thoracic spine non-blanchable deep red, purple discoloration and hyperpigmentation consistent with a deep tissue injury approximatively 11cm x 5cm x 0cm. present on admission.    ,Arrived on unit, patient was found lying in a low air loss pressure redistribution support surface style bed. The patient  is unable to turn independently and staff assistance x 2 was provided. Patient is very contracted, indwelling urethral catherer  in place. Once turned,  able to view her skin. Bilateral sacrum/coccyx/buttocks non-blanchable deep red, purple  discoloration and hyperpigmentation  consistent with a deep tissue injury  approximatively 16cm. x 16cm. x 0cm., present on admission.  Thoracic spine non-blanchable deep red, purple discoloration and hyperpigmentation consistent with a deep tissue injury approximatively 11cm x 5cm x 0cm., present on admission.    Arrived on unit, patient was found lying in a low air loss pressure redistribution support surface style bed. The patient  is unable to turn independently and staff assistance x 2 was provided. Patient is very contracted, indwelling urethral catheter  in place. Once turned,  able to view her skin. Bilateral sacrum/coccyx/buttocks non-blanchable deep red, purple  discoloration and hyperpigmentation  consistent with a deep tissue injury  approximatively 16cm. x 16cm. x 0cm., present on admission.  Thoracic spine non-blanchable deep red, purple discoloration and hyperpigmentation consistent with a deep tissue injury approximatively 11cm x 5cm x 0cm., present on admission.    Arrived on unit, patient was found lying in a low air loss pressure redistribution support surface style bed. The patient  is unable to turn independently and staff assistance x 2 was provided. Patient is very contracted, indwelling urethral catheter  in place. Once turned,  able to view his skin. Bilateral sacrum/coccyx/buttocks non-blanchable deep red, purple  discoloration and hyperpigmentation  consistent with a deep tissue injury  approximatively 16cm. x 16cm. x 0cm., present on admission.  Thoracic spine non-blanchable deep red, purple discoloration and hyperpigmentation consistent with a deep tissue injury approximatively 11cm x 5cm x 0cm., present on admission.

## 2024-07-26 NOTE — PROGRESS NOTE ADULT - PROBLEM SELECTOR PLAN 3
-Creatinine downtrending  -Daily BMP   -Per renal.
-Likely secondary to dehydration   -S/p IVF
-Creatinine downtrending  -Daily BMP   -Per renal.

## 2024-07-26 NOTE — PROGRESS NOTE ADULT - TIME BILLING
Agree with above ACP note.  events noted  tele events noted  low bp precludes bb  patient with poor prognosis, dnr/dni  cont tx per primary team

## 2024-07-26 NOTE — PROGRESS NOTE ADULT - PROBLEM SELECTOR PLAN 1
+COVID PCR   -Low grade fever on admission   -Remdesivir x 3-5 days if okay with renal (monitor creatinine, LFTs)  -Trend inflammatory markers   -LE duplex neg DVT  -DVT ppx.  -Pt now on o2, sats 100% on 2LNC. CXR with no clear infiltrate. Rhonchi improved today - continue Duoneb q6h.   -Continue to monitor off decadron at this time.  -Monitor for signs of aspiration, advised pts family at bedside to feed him when more alert.
+COVID PCR   -Low grade fever on admission   -Remdesivir x 3 days if okay with renal (monitor creatinine, LFTs)  -Pt normoxic, no indication for decadron at this time  -Trend inflammatory markers   -LE duplex neg DVT  -DVT ppx.
+COVID PCR   -Low grade fever on admission   -Remdesivir x 3-5 days if okay with renal (monitor creatinine, LFTs)  -Trend inflammatory markers   -LE duplex neg DVT  -DVT ppx.  -Pt now on o2, b/l rhonchi on exam. CXR ordered. Decadron had not been started yet as pt was normoxic prior to now. Will review CXR.  -Duoneb q6h started

## 2024-07-26 NOTE — PROGRESS NOTE ADULT - PROBLEM SELECTOR PLAN 4
by hx  -Management per primary team
by hx  -Management per primary team
-Creatinine uptrending   -Daily BMP   -Per renal.

## 2024-07-26 NOTE — PROGRESS NOTE ADULT - NS ATTEND AMEND GEN_ALL_CORE FT
IV bicarb as above
Pt care and plan discussed and reviewed with PA. Plan as outlined above edited by me to reflect our discussion.
Pt care and plan discussed and reviewed with PA. Plan as outlined above edited by me to reflect our discussion.    Discussed with son and daughter regarding GOC
improving sodium
Pt care and plan discussed and reviewed with PA. Plan as outlined above edited by me to reflect our discussion. Advanced care planning/advanced directives discussed with patient/family. DNR status including forceful chest compressions to attempt to restart the heart, ventilator support/artificial breathing, electric shock, artificial nutrition, health care proxy, Molst form all discussed with pt. More than 50% of the visit was spent counseling and/or coordinating care by the attending physician.
cxr with large gastric bubble  raised veronica diaph, atelectasis  observe off decadron  keep sat>90% w o2

## 2024-07-26 NOTE — PROGRESS NOTE ADULT - PROBLEM SELECTOR PLAN 2
-Likely secondary to dehydration   -IVF per renal, primary team.
-Likely secondary to dehydration   -IVF per renal, primary team.
-Hypotensive, elevated lactate  -IVF per primary team  -Stress dose steroids per primary team   -Check ABG

## 2024-07-26 NOTE — CHART NOTE - NSCHARTNOTEFT_GEN_A_CORE
Chart/Event Note  [facility] 4MON [address]  CAM GAMA, 89y, Male  [id number]    Reason for Notification:   Notified by RN and Telemetry that the above patient had a run of wide complex tachycardia on monitor for 25 beats. This the patient's first episode of wide complex 7/23 with 12 beats. Patient has had previous runs of wide complex throughout their admission.       Review of Systems:  Constitutional: No fever, chills, or fatigue.  Neurologic: No headache, dizziness, vision/speech changes, numbness, or weakness.  Respiratory: No cough, wheezing, dyspnea, or shortness of breath.  Cardiovascular: No chest pain, pressure, or palpitations.   GI: No abdominal pain, nausea, vomiting, diarrhea, constipation.   : No dysuria, burning, frequency, incontinence, or retention.   Skin: No itching, burning, rashes, or lesions .  Musculoskeletal: No joint pain or swelling.   Psychiatric: No depression, anxiety, or mood swings.    T(C): 36.7 (07-26-24 @ 06:15), Max: 36.7 (07-25-24 @ 11:43)  HR: 87 (07-26-24 @ 06:15) (68 - 106)  BP: 100/52 (07-26-24 @ 06:15) (75/40 - 100/55)  RR: 20 (07-26-24 @ 06:15) (18 - 20)  SpO2: 100% (07-26-24 @ 06:15) (93% - 100%)                        8.9    7.00  )-----------( 101      ( 25 Jul 2024 09:09 )             28.1        Physical Examination:  GENERAL: No acute distress noted during examination. Patient is well-groomed and developed.  NERVOUS SYSTEM:  Alert & oriented X3 with appropriate concentration. Motor strength is 5/5 in both bilateral upper and lower extremities. No focal or lateralizing neurologic deficits noted.   HEAD:  Atraumatic and normocephalic.  EYES: EOMI/PERRLA. Conjunctiva and sclera clear  ENMT: No tonsillar erythema, exudates, lesions, or enlargement. Moist mucous membranes with good dentition.   NECK: Supple with no JVD.   CHEST: Clear to ascultation bilaterally. No rales, rhonchi, wheezing, or rubs heard. Symmetrical/bilateral chest wall rise.   HEART: Regular rate and rhythm with no murmurs, rubs, or gallops.  ABDOMEN: Soft, nontender, and nondistended.   EXTREMITIES:  2+ Peripheral Pulses without clubbing, cyanosis, or edema.  LYMPH: No lymphadenopathy noted.  SKIN: No rashes or lesions seen.     Medical Decision Making/Assessment/Plan:    - Diagnosis: Wide Complex Tachycardia     1) Obtain Stat BMP/Magnesium/ phos to evaluate for electrolyte derangements. Will replace as needed.   2) Continue cardiac monitoring.   3) Stat EKG obtained and reviewed.   4) Cards following  4) Will endorse the above diagnostics and findings to the day medicine team for review and follow up. Will additionally sign out to day medicine teams to follow with Cardiology and other relevant specialties    Martinez Winston PA-C  Department of Medicine   [facility]

## 2024-07-27 NOTE — PROVIDER CONTACT NOTE (CRITICAL VALUE NOTIFICATION) - BACKGROUND
+Failure to thrive
Pt admitted for Failure to thrive in adult
Pt admitted for Failure to thrive in adult
+Failure to thrive
Pt admitted for failure to thrive . HX of hypertension, hyperlipidemia and CAD status post stents

## 2024-07-27 NOTE — PROVIDER CONTACT NOTE (CRITICAL VALUE NOTIFICATION) - ASSESSMENT
AOx0 (baseline) asymptomatic
Patient A&Ox0, incomprehensible speech. on room air. NSR on telemetry monitoring. Patiño intact. D5/0.45% NS infusing at 75ml/hr.
Pt AAOx0. VSS on 3L NC; no s/s of CP, SOB, no acute events noted on tele
Patient A&Ox0, incomprehensible speech. on room air. NSR on telemetry monitoring. Patiño intact. D5/0.45% NS infusing at 75ml/hr.
Pt AAOx0 lethargic. VSS on 2L; no s/s/ of CP, SOB, no acute events noted on tele

## 2024-07-27 NOTE — PROVIDER CONTACT NOTE (CRITICAL VALUE NOTIFICATION) - RECOMMENDATIONS
Notify Provider
Discontinue D5/0.45% NS. Reevaluate BMP.
Repeat blood work drawn
Notify Provider.
Notify provider.

## 2024-07-27 NOTE — PROVIDER CONTACT NOTE (CRITICAL VALUE NOTIFICATION) - SITUATION
Patient blood work results - Hemoglobin 6.9 and Glucose 546
Lactate 4.4
Patient has elevated Na 162
Lactate 4.7
Na 162

## 2024-07-27 NOTE — CHART NOTE - NSCHARTNOTEFT_GEN_A_CORE
CC; Hypotension    HPI;       Patient is a 89 year old male with a PMHx of HTN, HLD, CAD S/P stents, Dementia, Type II DM, BPH, bedbound, minimally verbal at baseline, farzi speaking, who presents to [facility] accompanied by his son. History obtained from Son at the bedside as patient with dementia and AxO X 0. Per son, patient has been only consuming ensures for 3 months now, no other oral intake. Per son, patient brought in due to worsening mental status X 1 day, and appears more disoriented. In the ER, patient was found to be severely dehydration and febrile to 100.5.   SEPSIS  Hypernatremia, Severe Dehydration   COVID  #elevated troponin , NSVT   -in setting of sepsis and severe renal failure   on midodrine   -echo shows severely reduced LV systolic function - Pt is not an appropriate candidate for cath at this time given septic shock , VERONICA and profound anemia       #Sepsis/ cOVID/Severely reduced EF/ Hypotension    Bcx, Ucx negative   Currently on Zosyn for empiric coverage CC; Hypotension    HPI; Notified by RN that pt;s BP 73/37 mm of hg  Evaluated the pt at bedside, speaking farzi, pt unable to use language line, interpreted by staff who speaks Farsi, pt non verbal mostly, contracted  Opens eyes to verbal, tactile stimuli,       Vital Signs Last 24 Hrs  T(C): 37.1 (27 Jul 2024 20:18), Max: 37.1 (27 Jul 2024 20:18)  T(F): 98.8 (27 Jul 2024 20:18), Max: 98.8 (27 Jul 2024 20:18)  HR: 121 (27 Jul 2024 21:34) (87 - 126)  BP: 85/45 (27 Jul 2024 21:34) (70/44 - 103/40)  BP(mean): --  RR: 20 (27 Jul 2024 21:34) (18 - 20)  SpO2: 91% (27 Jul 2024 21:34) (90% - 97%)    Parameters below as of 27 Jul 2024 21:34  Patient On (Oxygen Delivery Method): nasal cannula  O2 Flow (L/min): 4                            8.7    4.56  )-----------( 79       ( 27 Jul 2024 11:01 )             24.8     < from: Xray Chest 1 View- PORTABLE-Routine (Xray Chest 1 View- PORTABLE-Routine .) (07.25.24 @ 11:25) >      Limited evaluation. Questionable trace right pleural effusion.    < end of copied text >    Culture - Urine (07.23.24 @ 17:06)    Specimen Source: Clean Catch Clean Catch (Midstream)    Culture Results:   No growth    Culture - Blood (07.22.24 @ 11:00)    Specimen Source: .Blood Blood-Peripheral    Culture Results:   No growth at 5 days    Appearance: lethargic     Neuro: non verbal , contracted   Cardiovascular: Normal S1 S2, no JVD, ANASARCA  Respiratory: normal effort , clear  Gastrointestinal:  Soft, Non-tender  Skin: skin lesions, multiple DTI's wounds  Psychiatry: Very miniamlly responsive, not follows verbal commands  Musculuskeletal: contracted       A/P    Patient is a 89 year old male with a PMHx of HTN, HLD, CAD S/P stents, Dementia, Type II DM, BPH, bedbound, minimally verbal at baseline, farzi speaking, who presents to [facility] accompanied by his son. History obtained from Son at the bedside as patient with dementia and AxO X 0. Per son, patient has been only consuming ensures for 3 months now, no other oral intake. Per son, patient brought in due to worsening mental status X 1 day, and appears more disoriented. In the ER, patient was found to be severely dehydration and febrile to 100.5.   SEPSIS  Multiple wounds  Hypernatremia, Severe Dehydration   COVID  #elevated troponin , NSVT   -in setting of sepsis and severe renal failure   on midodrine   -echo shows severely reduced LV systolic function - Pt is not an appropriate candidate for cath at this time given septic shock , VERONICA and profound anemia       #Sepsis/ cOVID/Severely reduced EF/ Hypotension  Bcx, Ucx negative   Currently on Zosyn for empiric coverage  anasarca, low EF, no room for IVF  No pressors per family/  Administer 10 pm midodrine dose and iv steroid now  Recheck BP in an hour  Pt DNR/DNI    Per records,  Son is agreeable to DNR/DNI. Does not want pressors or patient to suffer. Son is declining palliative care consult or hospice discussions at this time  Continue GOC with pt;s family  Poor prognosis  Will sign out to day team    Maria T Webb [facility]  [id number]

## 2024-07-28 NOTE — PROGRESS NOTE ADULT - REASON FOR ADMISSION
Dehydration, Fever

## 2024-07-28 NOTE — PROGRESS NOTE ADULT - PROVIDER SPECIALTY LIST ADULT
Cardiology
Internal Medicine
Nephrology
Cardiology
Cardiology
Internal Medicine
Internal Medicine
Cardiology
Internal Medicine
Nephrology
Nephrology
Pulmonology

## 2024-07-28 NOTE — PROGRESS NOTE ADULT - ASSESSMENT
89 year old male with a PMHx of HTN, HLD, CAD S/P stents, Dementia, Type II DM, BPH, bedbound, minimally verbal at baseline who presents to [facility] due to worsening mental status X 1 day, was found to be severely dehydration and febrile to 100.5.     #Shock   #elevated troponin , NSVT   -in setting of sepsis and severe renal failure   -off asa given profound anemia   -off BB due to hypotension   -on midodrine, inc as needed to support bp  -echo shows severely reduced LV systolic function - Pt is not an appropriate candidate for cath at this time given septic shock , VERONICA and profound anemia   -ivf    #long standing afib   -HR overall stable , NSVT noted -- -off BB due to hypotension   -labs noted   -normal TSH  -given severe malnutrition , severe renal failure , profound anemia, frail state and very advance age , risk of spontaneous bleed outweighs benefit for systemic a/c    #VERONICA, hypernatremia, hypercalcemia  on IVF  plan as per renal     #COVID 19   management per pulm     pt is DNR / DNI       35 minutes spent on total encounter; more than 50% of the visit was spent counseling and/or coordinating care by the attending physician.    
Patient is a 89 year old male with a PMHx of HTN, HLD, CAD S/P stents, Dementia, Type II DM, BPH, bedbound, minimally verbal at baseline who presents to [facility] accompanied by his son. History obtained from Son at the bedside as patient with dementia and AxO X 0. Per son, patient has been only consuming ensures for 3 months now, no other oral intake. Per son, patient brought in due to worsening mental status X 1 day, and appears more disoriented. In the ER, patient was found to be severely dehydration and febrile to 100.5.     SEPSIS --> ? Due to UTI   - Pt febrile to 100.5, tachycardic, leukocytosis   - S/P LR and Rocephin in ER  - UA borderline. Check UCx - collected while patient on ABX - negative, ABx switched to zosyn   - F/u BCx2 --> NGTD; F/u final   - Trend Lactate    - Antipyretics PRN   - Trend CBC, temp curve, VS and monitor patient   - hydrocotison 50 TID for low BP     Hypernatremia, Severe Dehydration   - Was on LR, Na improving --> S/P IVF per renal   - Avoid overcorrection > 6-8 mEq in 24 hours; Trend BMP Q8   - Renal eval appreciated; F/u recs    Melena  - Episode of melena 7/24. ASA and Heparin held   - Hgb Stable --> ASA resumed, monitor H/H   - IV PPI BID   - Monitor for further bleeding   - GI eval PRN     COVID  - C/w RDV, monitor Cr and LFTs  - Hold off on steroids as patient is not hypoxic --> Now on 2L NC, discussed with pulm - hold off on steroids  - CXR with > trace R pleural effusion - likely 2/2 IVF   - LE Duplex neg for DVT   - Heparin for DVT PPX  - Monitor O2 saturation; Supplement to maintain >90%  - Pulm eval appreciated; F/u recs    R/O UTI   - UA borderline, w/ urinary retention   - UCx --> Neg  - F/u BCx2 --> NGTD; F/u final   - C/w Rocephin for ABX X 7 days  - Trend Lactate   - Trend CBC, temp curve, VS and monitor patient     Troponemia  - Likely type II in view of Cr and metabolic discrepancies  - Trend troponin to peak   - TTE -- w/ EF of 32 %, WMA  --> patient not a candidate for cath as per cardio   - Monitor on tele  - Cardio eval appreciated; F/u recs     VERONICA, Urinary retention, BPH   - S/P placement of Patiño in ER  - VERONICA likely 2/2 dehydration   - IVF for hydration as above   - Renal US  w/ chronic MRD, no hydro,   - Trend Cr in AM, avoid nephrotoxic agents--> Cr up-trending likely 2/2 hypoperfusion from Hypotension, monitor   - Renal eval appreciated; F/u recs    Dysphagia  - Per Son, patient has been dependent on Ensure X 3 months  - Has not tolerated any additional PO intake  - Now on Pureed diet per S+S w/ aspiration precautions - reviewed with son   - Hypoglycemic this AM- due to being NPO, started on D5 and Pureed diet by S+S  - Son would like to hold off on PEG/ NGT for now     Hypercalcemia  - Likely 2/2 Dehydration   - C/w IVF as per Renal As above  - Trend Ca in Am   - Hold home Ca supplementation     Afib  - C/w Metoprolol   - Not on AC (discussed with PCP). Cardio eval noted - risk outweigh benefits per cardio   - Monitor on tele  - Monitor and replete electrolytes to maintain K > 4 and Mg > 2  - Cardio eval appreciated; F/u recs    Elevated AST  - Continue to monitor and trend    Type II DM  - A1C 5.9   - Check FS Q6 while NPO  - Trend glucose levels    CAD S/P stents  - ASA and Statin   - Monitor on tele     Dementia  - Fall, Aspiration precautions    HTN   - Hold home Diltiazem medications  - C/w Toprol w/ Hold parameters   - Monitor BP, VS and adjust as tolerated    HLD  - lipid panel   - Simvastatin     GOC  - Extensive GOC discussion with Gerardo Mclain. Son is agreeable to DNR/DNI. Does not want pressors or patient to suffer. Son is declining palliative care consult or hospice discussions at this time    PPX      DIscussed in detail with filomena, no pressors 
Patient is a 89 year old male with a PMHx of HTN, HLD, CAD S/P stents, Dementia, Type II DM, BPH, bedbound, minimally verbal at baseline who presents to [facility] accompanied by his son. History obtained from Son at the bedside as patient with dementia and AxO X 0. Per son, patient has been only consuming ensures for 3 months now, no other oral intake. Per son, patient brought in due to worsening mental status X 1 day, and appears more disoriented. In the ER, patient was found to be severely dehydration and febrile to 100.5.     SEPSIS --> ? Due to UTI   - Pt febrile to 100.5, tachycardic, leukocytosis   - S/P LR and Rocephin in ER  - UA borderline. Check UCx - was not collected, patient on ABX   - F/u BCx2 --> In lab   - C/w Rocephin for ABX for now   - Trend Lactate    - Antipyretics PRN   - Trend CBC, temp curve, VS and monitor patient     Hypernatremia, Severe Dehydration   - Na of 162, Lactate of 4.3 --> 2.5   - Was on LR, Na without improvement, changed to D51/2 and this AM switched to D5 X 10 hours per renal.  Check BMP 4 PM and notify renal of results   - Avoid overcorrection > 6-8 mEq in 24 hours; Trend BMP Q8   - Renal eval appreciated; F/u recs    COVID  - C/w RDV, monitor Cr and LFTs  - Hold off on steroids as patient is not hypoxic  - Check LE Duplex  - Heparin for DVT PPX  - Monitor O2 saturation; Supplement to maintain >90%  - Pulm eval appreciated; F/u recs    R/O UTI   - UA borderline, w/ urinary retention   - UCx --> Was not collected. already on ABX   - F/u BCx2 --> In lab   - C/w Rocephin for ABX for now   - Trend Lactate   - Trend CBC, temp curve, VS and monitor patient     Troponemia  - Likely type II in view of Cr and metabolic discrepancies  - Trend troponin to peak   - Check TTE   - Monitor on tele  - Cardio eval appreciated; F/u recs     VERONICA, Urinary retention, BPH   - S/P placement of Patiño in ER  - VERONICA likely 2/2 dehydration   - IVF for hydration as above   - Renal US  w/ chronic MRD, no hydro,   - Trend Cr in AM, avoid nephrotoxic agents  - Renal eval appreciated; F/u recs    Dysphagia  - Per Son, patient has been dependent on Ensure X 3 months  - Has not tolerated any additional PO intake  - Keep NPO w/ IVF for now pending S+S eval   - FS Q6 while NPO   - Son would like to hold off on PEG/ NGT for now     Hypercalcemia  - Likely 2/2 Dehydration   - C/w IVF as per Renal As above  - Trend Ca in Am   - Hold home Ca supplementation     Afib  - C/w Metoprolol   - Not on AC (discussed with PCP). Cardio eval noted - risk outweigh benefits per cardio   - Monitor on tele  - Monitor and replete electrolytes to maintain K > 4 and Mg > 2  - Cardio eval appreciated; F/u recs    Elevated AST  - Continue to monitor and trend    Type II DM  - A1C 5.9   - Hold insulins in view of NPO status  - Check FS Q6 while NPO  - Trend glucose levels    CAD S/P stents  - ASA and Statin   - Monitor on tele     Dementia  - Fall, Aspiration precautions    HTN   - Hold home Diltiazem medications  - C/w Toprol w/ Hold parameters   - Monitor BP, VS and adjust as tolerated    HLD  - Check lipid panel   - Simvastatin     PPX      Discussed with Attending, Son and ACP 
Critically ill pt with history of cad s/p stents, HTN, Dm II  now with sepsis, covid infection   VERONICA   hypernatremia   hypercalcemia  malnutrition     elevated troponin , NSVT   in setting of sepsis and severe renal failure   on asa  on BB  FU results of echo to eval LV / RV function     long standing afib   HR overall stable   normal TSH  FU echo   given severe malnutrition , severe renal failure , frail state and very advance age , risk of spontaneous bleed outweighs benefit for systemic a/c    VERONICA, hypernatremia, hypercalcemia  on IVF  plan as per renal     pt is DNR / DNI     
Patient is a 89 year old male with a PMHx of HTN, HLD, CAD S/P stents, Dementia, Type II DM, BPH, bedbound, minimally verbal at baseline who presents to [facility] accompanied by his son. History obtained from Son at the bedside as patient with dementia and AxO X 0. Per son, patient has been only consuming ensures for 3 months now, no other oral intake. Per son, patient brought in due to worsening mental status X 1 day, and appears more disoriented. In the ER, patient was found to be severely dehydration and febrile to 100.5. WE got involved for hypernatremia and renal failure    A/P:  Hypernatremia:  clinically severely dehydrated  Urine studies suggestive of dehydration   Sodium improving with D5   Recommend D5 at 100cc for 12 hours today  Na should not change more than 6-8meq/L in 24 hrs    VERONICA:  Likely pre-renal  persistent pre-renal state causing ATN can't be ruled out  s/p christianson in the ER  Monitor I/O  Urine lytes suggestive of pre renal   renal US with no hydro     Hypercalcemia:  likely sec to dehydration  Also on calcium supplements  --PLEASE hold calcium supplements   IVF as above    Proteinuria/hematuria:  getting treated for cystitis  repeat UA after the treatment    
Patient is a 89 year old male with a PMHx of HTN, HLD, CAD S/P stents, Dementia, Type II DM, BPH, bedbound, minimally verbal at baseline who presents to [facility] accompanied by his son. History obtained from Son at the bedside as patient with dementia and AxO X 0. Per son, patient has been only consuming ensures for 3 months now, no other oral intake. Per son, patient brought in due to worsening mental status X 1 day, and appears more disoriented. In the ER, patient was found to be severely dehydration and febrile to 100.5.     SEPSIS --> ? Due to UTI   - Pt febrile to 100.5, tachycardic, leukocytosis   - S/P LR and Rocephin in ER  - UA borderline. Check UCx - collected while patient on ABX   - F/u BCx2 --> NGTD; F/u final   - C/w Rocephin for ABX for now   - Trend Lactate    - Antipyretics PRN   - Trend CBC, temp curve, VS and monitor patient     Hypernatremia, Severe Dehydration   - Na of 162, Lactate of 4.3 --> 2.4  - Was on LR, Na improving --> Continues on D5 as per renal   - Avoid overcorrection > 6-8 mEq in 24 hours; Trend BMP Q8   - Renal eval appreciated; F/u recs    Melena  - Episode of melena this AM. ASA and Heparin held   - Trend Hgb in AM   - IV PPI BID   - Monitor for further bleeding   - GI eval PRN     COVID  - C/w RDV, monitor Cr and LFTs  - Hold off on steroids as patient is not hypoxic  - LE Duplex neg for DVT   - Heparin for DVT PPX  - Monitor O2 saturation; Supplement to maintain >90%  - Pulm eval appreciated; F/u recs    R/O UTI   - UA borderline, w/ urinary retention   - UCx --> Was collected while on ABX   - F/u BCx2 --> NGTD; F/u final   - C/w Rocephin for ABX for now   - Trend Lactate   - Trend CBC, temp curve, VS and monitor patient     Troponemia  - Likely type II in view of Cr and metabolic discrepancies  - Trend troponin to peak   - Check TTE -- Pending   - Monitor on tele  - Cardio eval appreciated; F/u recs     VERONICA, Urinary retention, BPH   - S/P placement of Patiño in ER  - VERONICA likely 2/2 dehydration   - IVF for hydration as above   - Renal US  w/ chronic MRD, no hydro,   - Trend Cr in AM, avoid nephrotoxic agents  - Renal eval appreciated; F/u recs    Dysphagia  - Per Son, patient has been dependent on Ensure X 3 months  - Has not tolerated any additional PO intake  - Now on Pureed diet per S+S w/ aspiration precautions - reviewed with son   - Hypoglycemic this AM- due to being NPO, started on D5 and Pureed diet by S+S  - Son would like to hold off on PEG/ NGT for now     Hypercalcemia  - Likely 2/2 Dehydration   - C/w IVF as per Renal As above  - Trend Ca in Am   - Hold home Ca supplementation     Afib  - C/w Metoprolol   - Not on AC (discussed with PCP). Cardio eval noted - risk outweigh benefits per cardio   - Monitor on tele  - Monitor and replete electrolytes to maintain K > 4 and Mg > 2  - Cardio eval appreciated; F/u recs    Elevated AST  - Continue to monitor and trend    Type II DM  - A1C 5.9   - Hold insulins in view of NPO status  - Check FS Q6 while NPO  - Trend glucose levels    CAD S/P stents  - ASA and Statin   - Monitor on tele     Dementia  - Fall, Aspiration precautions    HTN   - Hold home Diltiazem medications  - C/w Toprol w/ Hold parameters   - Monitor BP, VS and adjust as tolerated    HLD  - Check lipid panel   - Simvastatin     PPX      Discussed with Attending, Son and ACP 
Patient is a 89 year old male with a PMHx of HTN, HLD, CAD S/P stents, Dementia, Type II DM, BPH, bedbound, minimally verbal at baseline who presents to [facility] accompanied by his son. History obtained from Son at the bedside as patient with dementia and AxO X 0. Per son, patient has been only consuming ensures for 3 months now, no other oral intake. Per son, patient brought in due to worsening mental status X 1 day, and appears more disoriented. In the ER, patient was found to be severely dehydration and febrile to 100.5. WE got involved for hypernatremia and renal failure    A/P:  Hypernatremia:  clinically severly dehydrated  no improvement with current IVF  CHange IVF to d5 at 100cc xx 10 hours   Repeat chemistry at 4 PM and call me with result  Na should not change more than 6-8meq/L in 24 hrs    VERONICA:  Likely pre-renal  persistent pre-renal state causing ATN can't be ruled out  s/p christianson in the ER  Monitor I/O  Urine Na, cr pendng   renal US with no hydro     Hypercalcemia:  likely sec to dehydration  Also on calcium supplements  --PLEASE hold calcium supplements   IVF as above    Proteinuria/hematuria:  getting treated for cystitis  repeat UA after the treatment    discussed with medical team
Patient is a 89 year old male with a PMHx of HTN, HLD, CAD S/P stents, Dementia, Type II DM, BPH, bedbound, minimally verbal at baseline who presents to [facility] accompanied by his son. History obtained from Son at the bedside as patient with dementia and AxO X 0. Per son, patient has been only consuming ensures for 3 months now, no other oral intake. Per son, patient brought in due to worsening mental status X 1 day, and appears more disoriented. In the ER, patient was found to be severely dehydration and febrile to 100.5. We got involved for hypernatremia and renal failure.    A/P:  Hypernatremia:  clinically severely dehydrated  Urine studies suggestive of dehydration   Sodium improving s/p d5  Monitor Na Pending labs today    VERONICA:  Likely pre-renal --> improved with IVF  s/p christianson in the ER  Monitor I/O  Urine lytes suggestive of pre renal   renal US with no hydro   On Remdesivir.  CBC w/ diff - no eosinophilia.  EF 32% on TTE 7/24.  S.Cr. continues to worsen - likely pre-renal sec. to hemodynamic changes from hypotension.  Pending labs today  Monitor BMP and UO.    Hypotension:  Possibly sec to sepsis.  Family does not want pressors.  On midodrine and hydrocortisone.  Monitor BP.    Hypercalcemia:  likely sec to dehydration  Also on calcium supplements; now off.  Monitor Ca.    Acidosis:  NonAG + AG.  Lactic acidosis + RF.  Monitor CO2.    D/W primary team and son at bedside.  Hyperphosphatemia:  In setting of RF.  Monitor for now.    Proteinuria/hematuria:  getting treated for cystitis  repeat UA after the treatment
89 year old male with a PMHx of HTN, HLD, CAD S/P stents, Dementia, Type II DM, BPH, bedbound, minimally verbal at baseline who presents to [facility] due to worsening mental status X 1 day, was found to be severely dehydration and febrile to 100.5.     #Shock   #elevated troponin , NSVT   -in setting of sepsis and severe renal failure   -off asa given profound anemia   -off BB due to hypotension   -on midodrine   -echo shows severely reduced LV systolic function - Pt is not an appropriate candidate for cath at this time given septic shock , VERONICA and profound anemia   -ivf    #long standing afib   -HR overall stable , NSVT noted -- -off BB due to hypotension   -labs noted   -normal TSH  -given severe malnutrition , severe renal failure , profound anemia, frail state and very advance age , risk of spontaneous bleed outweighs benefit for systemic a/c    #VERONICA, hypernatremia, hypercalcemia  on IVF  plan as per renal     #COVID 19   management per pulm     pt is DNR / DNI   
89 y/o M with PMH of PMH of HTN, HLD, CAD S/P stents, Dementia, Type II DM, BPH, bedbound, minimally verbal at baseline. Presents to [facility] accompanied by his son. Per son, patient has been only consuming ensures for 3 months now, no other oral intake. Per son, patient brought in due to worsening mental status X 1 day, and appears more disoriented. In the ER, patient was found to be severely dehydration and febrile to 100.5. +COVID. CXR grossly clear. 
Patient is a 89 year old male with a PMHx of HTN, HLD, CAD S/P stents, Dementia, Type II DM, BPH, bedbound, minimally verbal at baseline who presents to [facility] accompanied by his son. History obtained from Son at the bedside as patient with dementia and AxO X 0. Per son, patient has been only consuming ensures for 3 months now, no other oral intake. Per son, patient brought in due to worsening mental status X 1 day, and appears more disoriented. In the ER, patient was found to be severely dehydration and febrile to 100.5. We got involved for hypernatremia and renal failure.    A/P:  Hypernatremia:  clinically severely dehydrated  Urine studies suggestive of dehydration   Sodium improving with D5W  S/P D5W @ 100mL/hr x12 hrs on 7/24.  AM BMP likely in error - blood drawn from IV.  Repeat Na better.  Na stable today.  Started on D5W + 150mEq NaHCO3 @ 75mL/hr x24hrs.  Na should not change more than 6-8meq/L in 24 hrs  Monitor Na.    VERONICA:  Likely pre-renal  persistent pre-renal state causing ATN can't be ruled out  s/p christianson in the ER  Monitor I/O  Urine lytes suggestive of pre renal   renal US with no hydro   On Remdesivir.  CBC w/ diff - no eosinophilia.  EF 32% on TTE 7/24.  S.Cr. continues to worsen - likely pre-renal sec. to hemodynamic changes from hypotension.  S/p LR 500mL bolus on 7/25 and 250mL today.  IVF as above.  Monitor BMP and UO.    Hypotension:  Possibly sec to sepsis.  Family does not want pressors.  On midodrine and hydrocortisone.  S/p LR bolus.  IVF as above.  Monitor BP.    Hypercalcemia:  likely sec to dehydration  Also on calcium supplements; now off.  IVF as above  Monitor Ca.    Acidosis:  NonAG + AG.  Lactic acidosis + RF.  IVF as above.  Monitor CO2.    D/W primary team and son at bedside.  Hyperphosphatemia:  In setting of RF.  Monitor for now.    Proteinuria/hematuria:  getting treated for cystitis  repeat UA after the treatment
Patient is a 89 year old male with a PMHx of HTN, HLD, CAD S/P stents, Dementia, Type II DM, BPH, bedbound, minimally verbal at baseline who presents to [facility] accompanied by his son. History obtained from Son at the bedside as patient with dementia and AxO X 0. Per son, patient has been only consuming ensures for 3 months now, no other oral intake. Per son, patient brought in due to worsening mental status X 1 day, and appears more disoriented. In the ER, patient was found to be severely dehydration and febrile to 100.5. We got involved for hypernatremia and renal failure.    A/P:  Hypernatremia:  clinically severely dehydrated  Urine studies suggestive of dehydration   Sodium improving s/p d5  Monitor Na Pending labs today    VERONICA:  Likely pre-renal --> improved with IVF  Monitor I/O  Urine lytes suggestive of pre renal   renal US with no hydro   EF 32% on TTE 7/24.  S.Cr. continues to worsen - likely pre-renal sec. to hemodynamic changes from hypotension.  Pending labs today  Pt poor candidate for RRT   Follow up  GOC  Monitor BMP and UO.    Hypotension:  Possibly sec to sepsis.  Family does not want pressors.  On midodrine and hydrocortisone.  Monitor BP.    Hypercalcemia:  likely sec to dehydration  Also on calcium supplements; now off.  Monitor Ca.    Acidosis:  NonAG + AG.  Lactic acidosis + RF.  Monitor CO2.    D/W primary team and son at bedside.  Hyperphosphatemia:  In setting of RF.  Monitor for now.    Proteinuria/hematuria:  getting treated for cystitis  repeat UA after the treatment
Critically ill pt with history of cad s/p stents, HTN, Dm II  now with sepsis, covid infection   VERONICA   hypernatremia   hypercalcemia  malnutrition   Shock   elevated troponin , NSVT   in setting of sepsis and severe renal failure   off asa given profound anemia   off BB due to hypotension   on midodrine   echo shows severely reduced LV systolic function - Pt is not an appropriate candidate for cath at this time given septic shock , VERONICA and profound anemia     long standing afib   HR overall stable   normal TSH  FU echo   given severe malnutrition , severe renal failure , profound anemia, frail state and very advance age , risk of spontaneous bleed outweighs benefit for systemic a/c    VERONICA, hypernatremia, hypercalcemia  on IVF  plan as per renal     pt is DNR / DNI     I will be away till July 31st. Dr Douglas is covering me for any urgent cardiac issues.     
Patient is a 89 year old male with a PMHx of HTN, HLD, CAD S/P stents, Dementia, Type II DM, BPH, bedbound, minimally verbal at baseline who presents to [facility] accompanied by his son. History obtained from Son at the bedside as patient with dementia and AxO X 0. Per son, patient has been only consuming ensures for 3 months now, no other oral intake. Per son, patient brought in due to worsening mental status X 1 day, and appears more disoriented. In the ER, patient was found to be severely dehydration and febrile to 100.5. WE got involved for hypernatremia and renal failure    A/P:  Hypernatremia:  clinically severely dehydrated  Urine studies suggestive of dehydration   Sodium improving with D5W  S/P D5W @ 100mL/hr x12 hrs on 7/24.  AM BMP likely in error - blood drawn from IV.  Repeat Na better.  Na should not change more than 6-8meq/L in 24 hrs  Monitor Na.    VERONICA:  Likely pre-renal  persistent pre-renal state causing ATN can't be ruled out  s/p christianson in the ER  Monitor I/O  Urine lytes suggestive of pre renal   renal US with no hydro   On Remdesivir.  CBC w/ diff - no eosinophilia.  S.Cr. worsened again today - likely pre-renal sec. to hemodynamic changes from hypotension.  Receiving LR 500mL bolus.  Monitor BMP and UO.    Hypercalcemia:  likely sec to dehydration  Also on calcium supplements; now off.  IVF as above    Proteinuria/hematuria:  getting treated for cystitis  repeat UA after the treatment    Acidosis:  NonAG.  Hyperchloremic.  IVF as above.  Monitor CO2.
Patient is a 89 year old male with a PMHx of HTN, HLD, CAD S/P stents, Dementia, Type II DM, BPH, bedbound, minimally verbal at baseline who presents to [facility] accompanied by his son. History obtained from Son at the bedside as patient with dementia and AxO X 0. Per son, patient has been only consuming ensures for 3 months now, no other oral intake. Per son, patient brought in due to worsening mental status X 1 day, and appears more disoriented. In the ER, patient was found to be severely dehydration and febrile to 100.5.     SEPSIS --> ? Due to UTI   - Pt febrile to 100.5, tachycardic, leukocytosis   - S/P LR and Rocephin in ER  - UA borderline. Check UCx - collected while patient on ABX - negative, C/w Rocephin to complete 7 day course   - F/u BCx2 --> NGTD; F/u final   - Trend Lactate    - Antipyretics PRN   - Trend CBC, temp curve, VS and monitor patient     Hypernatremia, Severe Dehydration   - Was on LR, Na improving --> S/P IVF per renal   - Avoid overcorrection > 6-8 mEq in 24 hours; Trend BMP Q8   - Renal eval appreciated; F/u recs    Melena  - Episode of melena 7/24. ASA and Heparin held   - Hgb Stable --> ASA resumed, monitor H/H   - IV PPI BID   - Monitor for further bleeding   - GI eval PRN     COVID  - C/w RDV, monitor Cr and LFTs  - Hold off on steroids as patient is not hypoxic --> Now on 2L NC, discussed with pulm - hold off on steroids  - CXR with > trace R pleural effusion - likely 2/2 IVF   - LE Duplex neg for DVT   - Heparin for DVT PPX  - Monitor O2 saturation; Supplement to maintain >90%  - Pulm eval appreciated; F/u recs    R/O UTI   - UA borderline, w/ urinary retention   - UCx --> Neg  - F/u BCx2 --> NGTD; F/u final   - C/w Rocephin for ABX X 7 days  - Trend Lactate   - Trend CBC, temp curve, VS and monitor patient     Troponemia  - Likely type II in view of Cr and metabolic discrepancies  - Trend troponin to peak   - TTE -- w/ EF of 32 %, WMA  --> patient not a candidate for cath as per cardio   - Monitor on tele  - Cardio eval appreciated; F/u recs     VERONICA, Urinary retention, BPH   - S/P placement of Patiño in ER  - VERONICA likely 2/2 dehydration   - IVF for hydration as above   - Renal US  w/ chronic MRD, no hydro,   - Trend Cr in AM, avoid nephrotoxic agents--> Cr up-trending likely 2/2 hypoperfusion from Hypotension, monitor   - Renal eval appreciated; F/u recs    Dysphagia  - Per Son, patient has been dependent on Ensure X 3 months  - Has not tolerated any additional PO intake  - Now on Pureed diet per S+S w/ aspiration precautions - reviewed with son   - Hypoglycemic this AM- due to being NPO, started on D5 and Pureed diet by S+S  - Son would like to hold off on PEG/ NGT for now     Hypercalcemia  - Likely 2/2 Dehydration   - C/w IVF as per Renal As above  - Trend Ca in Am   - Hold home Ca supplementation     Afib  - C/w Metoprolol   - Not on AC (discussed with PCP). Cardio eval noted - risk outweigh benefits per cardio   - Monitor on tele  - Monitor and replete electrolytes to maintain K > 4 and Mg > 2  - Cardio eval appreciated; F/u recs    Elevated AST  - Continue to monitor and trend    Type II DM  - A1C 5.9   - Check FS Q6 while NPO  - Trend glucose levels    CAD S/P stents  - ASA and Statin   - Monitor on tele     Dementia  - Fall, Aspiration precautions    HTN   - Hold home Diltiazem medications  - C/w Toprol w/ Hold parameters   - Monitor BP, VS and adjust as tolerated    HLD  - lipid panel   - Simvastatin     GOC  - Extensive GOC discussion with SonGerardo. Son is agreeable to DNR/DNI. Does not want pressors or patient to suffer. Son is declining palliative care consult or hospice discussions at this time    PPX      Discussed with Attending, Son and ACP 
Patient is a 89 year old male with a PMHx of HTN, HLD, CAD S/P stents, Dementia, Type II DM, BPH, bedbound, minimally verbal at baseline who presents to [facility] accompanied by his son. History obtained from Son at the bedside as patient with dementia and AxO X 0. Per son, patient has been only consuming ensures for 3 months now, no other oral intake. Per son, patient brought in due to worsening mental status X 1 day, and appears more disoriented. In the ER, patient was found to be severely dehydration and febrile to 100.5.     SEPSIS --> ? Due to UTI   - Pt febrile to 100.5, tachycardic, leukocytosis   - S/P LR and Rocephin in ER  - UA borderline. Check UCx - collected while patient on ABX - negative, ABx switched to zosyn   - F/u BCx2 --> NGTD; F/u final   - Trend Lactate    - Antipyretics PRN   - Trend CBC, temp curve, VS and monitor patient   - hydrocotison 50 TID for low BP     Hypernatremia, Severe Dehydration   - Was on LR, Na improving --> S/P IVF per renal   - Avoid overcorrection > 6-8 mEq in 24 hours; Trend BMP Q8   - Renal eval appreciated; F/u recs    Melena  - Episode of melena 7/24. ASA and Heparin held   - Hgb Stable --> ASA resumed, monitor H/H   - IV PPI BID   - Monitor for further bleeding   - GI eval PRN     COVID  - C/w RDV, monitor Cr and LFTs  - Hold off on steroids as patient is not hypoxic --> Now on 2L NC, discussed with pulm - hold off on steroids  - CXR with > trace R pleural effusion - likely 2/2 IVF   - LE Duplex neg for DVT   - Heparin for DVT PPX  - Monitor O2 saturation; Supplement to maintain >90%  - Pulm eval appreciated; F/u recs    R/O UTI   - UA borderline, w/ urinary retention   - UCx --> Neg  - F/u BCx2 --> NGTD; F/u final   - C/w Rocephin for ABX X 7 days  - Trend Lactate   - Trend CBC, temp curve, VS and monitor patient     Troponemia  - Likely type II in view of Cr and metabolic discrepancies  - Trend troponin to peak   - TTE -- w/ EF of 32 %, WMA  --> patient not a candidate for cath as per cardio   - Monitor on tele  - Cardio eval appreciated; F/u recs     VERONICA, Urinary retention, BPH   - S/P placement of Patiño in ER  - VERONICA likely 2/2 dehydration   - IVF for hydration as above   - Renal US  w/ chronic MRD, no hydro,   - Trend Cr in AM, avoid nephrotoxic agents--> Cr up-trending likely 2/2 hypoperfusion from Hypotension, monitor   - Renal eval appreciated; F/u recs    Dysphagia  - Per Son, patient has been dependent on Ensure X 3 months  - Has not tolerated any additional PO intake  - Now on Pureed diet per S+S w/ aspiration precautions - reviewed with son   - Hypoglycemic this AM- due to being NPO, started on D5 and Pureed diet by S+S  - Son would like to hold off on PEG/ NGT for now     Hypercalcemia  - Likely 2/2 Dehydration   - C/w IVF as per Renal As above  - Trend Ca in Am   - Hold home Ca supplementation     Afib  - C/w Metoprolol   - Not on AC (discussed with PCP). Cardio eval noted - risk outweigh benefits per cardio   - Monitor on tele  - Monitor and replete electrolytes to maintain K > 4 and Mg > 2  - Cardio eval appreciated; F/u recs    Elevated AST  - Continue to monitor and trend    Type II DM  - A1C 5.9   - Check FS Q6 while NPO  - Trend glucose levels    CAD S/P stents  - ASA and Statin   - Monitor on tele     Dementia  - Fall, Aspiration precautions    HTN   - Hold home Diltiazem medications  - C/w Toprol w/ Hold parameters   - Monitor BP, VS and adjust as tolerated    HLD  - lipid panel   - Simvastatin     GOC  - Extensive GOC discussion with Gerardo Mclain. Son is agreeable to DNR/DNI. Does not want pressors or patient to suffer. Son is declining palliative care consult or hospice discussions at this time    PPX      DIscussed in detail with filomena, no pressors 
Patient is a 89 year old male with a PMHx of HTN, HLD, CAD S/P stents, Dementia, Type II DM, BPH, bedbound, minimally verbal at baseline who presents to [facility] accompanied by his son. History obtained from Son at the bedside as patient with dementia and AxO X 0. Per son, patient has been only consuming ensures for 3 months now, no other oral intake. Per son, patient brought in due to worsening mental status X 1 day, and appears more disoriented. In the ER, patient was found to be severely dehydration and febrile to 100.5.     SEPSIS --> ? Due to UTI   - Pt febrile to 100.5, tachycardic, leukocytosis   - S/P LR and Rocephin in ER  - UA borderline. Check UCx - collected while patient on ABX - negative, ABx switched to zosyn   - F/u BCx2 --> NGTD; F/u final   - Trend Lactate    - Antipyretics PRN   - Trend CBC, temp curve, VS and monitor patient   - hydrocotison 50 TID for low BP     Hypernatremia, Severe Dehydration   - Was on LR, Na improving --> S/P IVF per renal   - Avoid overcorrection > 6-8 mEq in 24 hours; Trend BMP Q8   - Renal eval appreciated; F/u recs    Melena  - Episode of melena 7/24. ASA and Heparin held   - Hgb Stable --> ASA resumed, monitor H/H   - IV PPI BID   - Monitor for further bleeding   - GI eval PRN     COVID  - C/w RDV, monitor Cr and LFTs  - Hold off on steroids as patient is not hypoxic --> Now on 2L NC, discussed with pulm - hold off on steroids  - CXR with > trace R pleural effusion - likely 2/2 IVF   - LE Duplex neg for DVT   - Heparin for DVT PPX  - Monitor O2 saturation; Supplement to maintain >90%  - Pulm eval appreciated; F/u recs    R/O UTI   - UA borderline, w/ urinary retention   - UCx --> Neg  - F/u BCx2 --> NGTD; F/u final   - C/w Rocephin for ABX X 7 days  - Trend Lactate   - Trend CBC, temp curve, VS and monitor patient     Troponemia  - Likely type II in view of Cr and metabolic discrepancies  - Trend troponin to peak   - TTE -- w/ EF of 32 %, WMA  --> patient not a candidate for cath as per cardio   - Monitor on tele  - Cardio eval appreciated; F/u recs     VERONICA, Urinary retention, BPH   - S/P placement of Patiño in ER  - VERONICA likely 2/2 dehydration   - IVF for hydration as above   - Renal US  w/ chronic MRD, no hydro,   - Trend Cr in AM, avoid nephrotoxic agents--> Cr up-trending likely 2/2 hypoperfusion from Hypotension, monitor   - Renal eval appreciated; F/u recs    Dysphagia  - Per Son, patient has been dependent on Ensure X 3 months  - Has not tolerated any additional PO intake  - Now on Pureed diet per S+S w/ aspiration precautions - reviewed with son   - Hypoglycemic this AM- due to being NPO, started on D5 and Pureed diet by S+S  - Son would like to hold off on PEG/ NGT for now     Hypercalcemia  - Likely 2/2 Dehydration   - C/w IVF as per Renal As above  - Trend Ca in Am   - Hold home Ca supplementation     Afib  - C/w Metoprolol   - Not on AC (discussed with PCP). Cardio eval noted - risk outweigh benefits per cardio   - Monitor on tele  - Monitor and replete electrolytes to maintain K > 4 and Mg > 2  - Cardio eval appreciated; F/u recs    Elevated AST  - Continue to monitor and trend    Type II DM  - A1C 5.9   - Check FS Q6 while NPO  - Trend glucose levels    CAD S/P stents  - ASA and Statin   - Monitor on tele     Dementia  - Fall, Aspiration precautions    HTN   - Hold home Diltiazem medications  - C/w Toprol w/ Hold parameters   - Monitor BP, VS and adjust as tolerated    HLD  - lipid panel   - Simvastatin       PPX      DIscussed in detail with son, no pressors 
89 y/o M with PMH of PMH of HTN, HLD, CAD S/P stents, Dementia, Type II DM, BPH, bedbound, minimally verbal at baseline. Presents to [facility] accompanied by his son. Per son, patient has been only consuming ensures for 3 months now, no other oral intake. Per son, patient brought in due to worsening mental status X 1 day, and appears more disoriented. In the ER, patient was found to be severely dehydration and febrile to 100.5. +COVID. CXR grossly clear. 
89 y/o M with PMH of PMH of HTN, HLD, CAD S/P stents, Dementia, Type II DM, BPH, bedbound, minimally verbal at baseline. Presents to [facility] accompanied by his son. Per son, patient has been only consuming ensures for 3 months now, no other oral intake. Per son, patient brought in due to worsening mental status X 1 day, and appears more disoriented. In the ER, patient was found to be severely dehydration and febrile to 100.5. +COVID.

## 2024-07-28 NOTE — PROGRESS NOTE ADULT - NUTRITIONAL ASSESSMENT
This patient has been assessed with a concern for Malnutrition and has been determined to have a diagnosis/diagnoses of Severe protein-calorie malnutrition and Underweight (BMI < 19).    This patient is being managed with:   Diet Pureed-  Supplement Feeding Modality:  Oral  Ensure Plus High Protein Cans or Servings Per Day:  3       Frequency:  Daily  Entered: Jul 24 2024  3:20PM  

## 2024-07-28 NOTE — PROGRESS NOTE ADULT - SUBJECTIVE AND OBJECTIVE BOX
Subjective: Patient seen and examined. No new events except as noted.     SUBJECTIVE/ROS:    ROS limited     MEDICATIONS:  MEDICATIONS  (STANDING):  ascorbic acid 500 milliGRAM(s) Oral daily  aspirin  chewable 81 milliGRAM(s) Oral daily  cefTRIAXone   IVPB 1000 milliGRAM(s) IV Intermittent every 24 hours  chlorhexidine 2% Cloths 1 Application(s) Topical daily  dextrose 5%. 500 milliLiter(s) (100 mL/Hr) IV Continuous <Continuous>  dextrose 5%. 1000 milliLiter(s) (100 mL/Hr) IV Continuous <Continuous>  dextrose 5%. 1000 milliLiter(s) (100 mL/Hr) IV Continuous <Continuous>  folic acid 1 milliGRAM(s) Oral daily  heparin   Injectable 5000 Unit(s) SubCutaneous every 12 hours  metoprolol tartrate 25 milliGRAM(s) Oral two times a day  multivitamin 1 Tablet(s) Oral daily  pantoprazole    Tablet 40 milliGRAM(s) Oral before breakfast  petrolatum Ophthalmic Ointment 1 Application(s) Both EYES at bedtime  remdesivir  IVPB 100 milliGRAM(s) IV Intermittent every 24 hours  remdesivir  IVPB   IV Intermittent   simvastatin 20 milliGRAM(s) Oral at bedtime  tamsulosin 0.4 milliGRAM(s) Oral at bedtime      PHYSICAL EXAM:  T(C): 36.7 (07-24-24 @ 04:39), Max: 36.9 (07-23-24 @ 20:37)  HR: 98 (07-24-24 @ 04:39) (91 - 98)  BP: 105/62 (07-24-24 @ 04:39) (105/62 - 126/58)  RR: 18 (07-23-24 @ 11:25) (18 - 18)  SpO2: 93% (07-23-24 @ 11:25) (93% - 93%)  Wt(kg): --  I&O's Summary    23 Jul 2024 07:01  -  24 Jul 2024 07:00  --------------------------------------------------------  IN: 0 mL / OUT: 150 mL / NET: -150 mL            JVP: Normal  Neck: supple  Lung: clear   CV: S1 S2 , Murmur:  Abd: soft  Ext: No edema  Psych: flat affect  Skin: normal``    LABS/DATA:    CARDIAC MARKERS:                                9.9    7.72  )-----------( 107      ( 24 Jul 2024 07:11 )             33.1     07-24    151<H>  |  120<H>  |  98<H>  ----------------------------<  57<L>  4.5   |  17<L>  |  2.87<H>    Ca    10.9<H>      24 Jul 2024 07:12  Phos  3.4     07-23  Mg     2.2     07-23    TPro  6.4  /  Alb  2.8<L>  /  TBili  0.3  /  DBili  x   /  AST  55<H>  /  ALT  18  /  AlkPhos  78  07-23    proBNP:   Lipid Profile:   HgA1c:   TSH:     TELE:  EKG:        
CARDIOLOGY FOLLOW UP - Dr. Douglas - coverage for DR ching  DATE OF SERVICE: 7/26/24    CC tele events noted   unable to Obtain ROS      REVIEW OF SYSTEMS:  dong    PHYSICAL EXAM:  T(C): 36.7 (07-26-24 @ 06:15), Max: 36.7 (07-25-24 @ 11:43)  HR: 87 (07-26-24 @ 06:15) (68 - 106)  BP: 100/52 (07-26-24 @ 06:15) (75/40 - 100/52)  RR: 20 (07-26-24 @ 06:15) (18 - 20)  SpO2: 100% (07-26-24 @ 06:15) (93% - 100%)  Wt(kg): --  I&O's Summary    25 Jul 2024 07:01  -  26 Jul 2024 07:00  --------------------------------------------------------  IN: 240 mL / OUT: 500 mL / NET: -260 mL        Cardiovascular: Normal S1 S2,iirreg  Respiratory:  diminsihed   Gastrointestinal:  Soft, Non-tender, + BS	  Extremities: Normal range of motion, No clubbing, cyanosis or edema      Home Medications:  allopurinol 100 mg oral tablet: orally 2 times a day (23 Jul 2024 11:44)  ascorbic acid: 500 milligram(s) orally prn (23 Jul 2024 11:48)  aspirin 81 mg oral tablet: 81 milligram(s) orally once a day (23 Jul 2024 11:48)  ergocalciferol 1.25 mg (50,000 intl units) oral capsule: 1 cap(s) orally once a week (23 Jul 2024 11:46)  loratadine 10 mg oral tablet: 1 tab(s) orally once a day (23 Jul 2024 11:47)  multivitamin: 1 tab(s) orally once a day (23 Jul 2024 11:42)  nateglinide 60 mg oral tablet: 1 tab(s) orally 3 times a day (23 Jul 2024 11:45)  omeprazole 20 mg oral delayed release tablet: 1 tab(s) orally once a day (23 Jul 2024 11:42)  Os-Nicola 500 + D: 1 tab(s) orally once a day (23 Jul 2024 11:42)  simvastatin 20 mg oral tablet: 1 tab(s) orally once a day (at bedtime) (23 Jul 2024 11:42)  tamsulosin 0.4 mg oral capsule: 1 cap(s) orally once a day (23 Jul 2024 11:42)  Ursodiol: 300 milligram(s) orally 2 times a day (23 Jul 2024 11:44)      MEDICATIONS  (STANDING):  albuterol/ipratropium for Nebulization 3 milliLiter(s) Nebulizer every 6 hours  ascorbic acid 500 milliGRAM(s) Oral daily  aspirin  chewable 81 milliGRAM(s) Oral daily  chlorhexidine 2% Cloths 1 Application(s) Topical daily  folic acid 1 milliGRAM(s) Oral daily  hydrocortisone sodium succinate Injectable 50 milliGRAM(s) IV Push every 8 hours  midodrine 10 milliGRAM(s) Oral every 8 hours  multivitamin 1 Tablet(s) Oral daily  mupirocin 2% Nasal 1 Application(s) Both Nostrils two times a day  pantoprazole  Injectable 40 milliGRAM(s) IV Push every 12 hours  petrolatum Ophthalmic Ointment 1 Application(s) Both EYES at bedtime  piperacillin/tazobactam IVPB. 3.375 Gram(s) IV Intermittent once  piperacillin/tazobactam IVPB.- 3.375 Gram(s) IV Intermittent once  remdesivir  IVPB 100 milliGRAM(s) IV Intermittent every 24 hours  remdesivir  IVPB   IV Intermittent   simvastatin 20 milliGRAM(s) Oral at bedtime      TELEMETRY: Afib/NSR/PVC  WCT doc up to 125 beats	    ECG:  	  RADIOLOGY:   DIAGNOSTIC TESTING:  [ ] Echocardiogram:  [ ]  Catheterization:  [ ] Stress Test:    OTHER: 	    LABS:	 	    Troponin T, High Sensitivity Result: 226 ng/L [0 - 51] (07-23 @ 17:06)  Troponin T, High Sensitivity Result: 236 ng/L [0 - 51] (07-23 @ 07:18)  Troponin T, High Sensitivity Result: 236 ng/L [0 - 51] (07-22 @ 19:04)  Troponin T, High Sensitivity Result: 194 ng/L [0 - 51] (07-22 @ 15:02)  Troponin T, High Sensitivity Result: 265 ng/L [0 - 51] (07-22 @ 11:20)                          9.4    4.36  )-----------( 128      ( 26 Jul 2024 10:28 )             28.0     07-26    143  |  107  |  122<H>  ----------------------------<  123<H>  4.4   |  16<L>  |  3.53<H>    Ca    10.2      26 Jul 2024 07:48  Phos  5.0     07-26  Mg     2.0     07-26    TPro  4.9<L>  /  Alb  2.0<L>  /  TBili  0.3  /  DBili  x   /  AST  36  /  ALT  24  /  AlkPhos  90  07-26            
[facility] [place] [facility]   MD JACKELIN MARIA MD RUORU WONG, PA    TEL:  FROM 9 AM to 5 PM ---OFFICE: [phone]  AVAILABLE ON TEAMS    FROM 5 PM - 9 AM PLEASE CALL ANSWERING SERVICE: [phone]    RENAL FOLLOW UP NOTE--Date of Service 07-24-24 @ 10:59  --------------------------------------------------------------------------------  HPI:      Pt seen and examined at bedside.       PAST HISTORY  --------------------------------------------------------------------------------  No significant changes to PMH, PSH, FHx, SHx, unless otherwise noted    ALLERGIES & MEDICATIONS  --------------------------------------------------------------------------------  Allergies    No Known Allergies    Intolerances      Standing Inpatient Medications  ascorbic acid 500 milliGRAM(s) Oral daily  cefTRIAXone   IVPB 1000 milliGRAM(s) IV Intermittent every 24 hours  chlorhexidine 2% Cloths 1 Application(s) Topical daily  folic acid 1 milliGRAM(s) Oral daily  heparin   Injectable 5000 Unit(s) SubCutaneous every 12 hours  metoprolol tartrate 25 milliGRAM(s) Oral two times a day  multivitamin 1 Tablet(s) Oral daily  pantoprazole  Injectable 40 milliGRAM(s) IV Push every 12 hours  petrolatum Ophthalmic Ointment 1 Application(s) Both EYES at bedtime  remdesivir  IVPB 100 milliGRAM(s) IV Intermittent every 24 hours  remdesivir  IVPB   IV Intermittent   simvastatin 20 milliGRAM(s) Oral at bedtime  tamsulosin 0.4 milliGRAM(s) Oral at bedtime    PRN Inpatient Medications  loratadine 10 milliGRAM(s) Oral daily PRN      REVIEW OF SYSTEMS  --------------------------------------------------------------------------------  General: no fever  MSK: no edema     VITALS/PHYSICAL EXAM  --------------------------------------------------------------------------------  T(C): 36.7 (07-24-24 @ 04:39), Max: 36.9 (07-23-24 @ 20:37)  HR: 98 (07-24-24 @ 04:39) (91 - 98)  BP: 105/62 (07-24-24 @ 04:39) (105/62 - 126/58)  RR: 18 (07-23-24 @ 11:25) (18 - 18)  SpO2: 93% (07-23-24 @ 11:25) (93% - 93%)  Wt(kg): --        07-23-24 @ 07:01  -  07-24-24 @ 07:00  --------------------------------------------------------  IN: 0 mL / OUT: 150 mL / NET: -150 mL      Physical Exam:  	Gen: NAD  	HEENT: MMM  	Pulm: CTA B/L  	CV: S1S2  	Abd: Soft, +BS  	Ext: No LE edema B/L                      Neuro: lethargic  	Skin: Warm and Dry   	Vascular access: NO HD catheter            christianson  LABS/STUDIES  --------------------------------------------------------------------------------              9.9    7.72  >-----------<  107      [07-24-24 @ 07:11]              33.1     151  |  120  |  98  ----------------------------<  57      [07-24-24 @ 07:12]  4.5   |  17  |  2.87        Ca     10.9     [07-24-24 @ 07:12]      Mg     2.2     [07-23-24 @ 07:18]      Phos  3.4     [07-23-24 @ 07:18]    TPro  6.4  /  Alb  2.8  /  TBili  0.3  /  DBili  x   /  AST  55  /  ALT  18  /  AlkPhos  78  [07-23-24 @ 07:18]    PT/INR: PT 14.8 , INR 1.42       [07-22-24 @ 11:20]  PTT: 28.4       [07-22-24 @ 11:20]      Creatinine Trend:  SCr 2.87 [07-24 @ 07:12]  SCr 2.99 [07-23 @ 17:06]  SCr 3.11 [07-23 @ 07:18]  SCr 3.06 [07-23 @ 01:18]  SCr 3.02 [07-22 @ 19:04]    Urinalysis - [07-24-24 @ 07:12]      Color  / Appearance  / SG  / pH       Gluc 57 / Ketone   / Bili  / Urobili        Blood  / Protein  / Leuk Est  / Nitrite       RBC  / WBC  / Hyaline  / Gran  / Sq Epi  / Non Sq Epi  / Bacteria     Urine Creatinine 58      [07-24-24 @ 10:10]  Urine Sodium 29      [07-24-24 @ 10:10]  Urine Osmolality 516      [07-24-24 @ 10:10]    TSH 0.33      [07-23-24 @ 07:18]      
    Subjective: Patient seen and examined. No new events except as noted.     SUBJECTIVE/ROS:  episode of hypotension s/p IVF      MEDICATIONS:  MEDICATIONS  (STANDING):  ascorbic acid 500 milliGRAM(s) Oral daily  cefTRIAXone   IVPB 1000 milliGRAM(s) IV Intermittent every 24 hours  chlorhexidine 2% Cloths 1 Application(s) Topical daily  folic acid 1 milliGRAM(s) Oral daily  midodrine 10 milliGRAM(s) Oral every 8 hours  multivitamin 1 Tablet(s) Oral daily  mupirocin 2% Nasal 1 Application(s) Both Nostrils two times a day  pantoprazole  Injectable 40 milliGRAM(s) IV Push every 12 hours  petrolatum Ophthalmic Ointment 1 Application(s) Both EYES at bedtime  remdesivir  IVPB 100 milliGRAM(s) IV Intermittent every 24 hours  remdesivir  IVPB   IV Intermittent   simvastatin 20 milliGRAM(s) Oral at bedtime  tamsulosin 0.4 milliGRAM(s) Oral at bedtime      PHYSICAL EXAM:  T(C): 36.7 (07-25-24 @ 06:18), Max: 36.9 (07-24-24 @ 21:37)  HR: 89 (07-25-24 @ 08:21) (73 - 91)  BP: 100/55 (07-25-24 @ 08:21) (76/44 - 105/52)  RR: 20 (07-25-24 @ 06:18) (19 - 20)  SpO2: 96% (07-25-24 @ 06:18) (94% - 96%)  Wt(kg): --  I&O's Summary    24 Jul 2024 07:01  -  25 Jul 2024 07:00  --------------------------------------------------------  IN: 1320 mL / OUT: 200 mL / NET: 1120 mL            JVP: Normal  Neck: supple  Lung: clear   CV: S1 S2 , Murmur:  Abd: soft  Ext: No edema  Psych: flat affect  Skin: normal``    LABS/DATA:    CARDIAC MARKERS:                                6.9    5.32  )-----------( 85       ( 25 Jul 2024 07:00 )             22.5     07-25    137  |  104  |  102<H>  ----------------------------<  546<HH>  3.8   |  17<L>  |  2.79<H>    Ca    9.4      25 Jul 2024 07:03  Phos  4.2     07-25  Mg     1.8     07-25    TPro  4.8<L>  /  Alb  2.0<L>  /  TBili  0.3  /  DBili  x   /  AST  36  /  ALT  20  /  AlkPhos  66  07-24    proBNP:   Lipid Profile:   HgA1c:   TSH:     TELE:  EKG:        
CARDIOLOGY FOLLOW UP NOTE - DR. MUÑOZ    Patient Name: CAM GAMA    Date of Service: 07-28-24 @ 11:18    Patient seen and examined    Subjective:    cv: denies chest pain, dyspnea, palpitations, dizziness  pulmonary: denies cough  GI: denies abdominal pain, nausea, vomiting  vascular/legs: no edema   skin: no rash  ROS: otherwise negative   overnight events:      PHYSICAL EXAM:  T(C): 36.5 (07-28-24 @ 10:59), Max: 37.1 (07-27-24 @ 20:18)  HR: 106 (07-28-24 @ 10:59) (98 - 126)  BP: 81/54 (07-28-24 @ 10:59) (70/44 - 99/57)  RR: 19 (07-28-24 @ 10:59) (18 - 20)  SpO2: 93% (07-28-24 @ 10:59) (90% - 95%)  Wt(kg): --  I&O's Summary    27 Jul 2024 07:01  -  28 Jul 2024 07:00  --------------------------------------------------------  IN: 0 mL / OUT: 100 mL / NET: -100 mL      Daily     Daily     Appearance: Normal	  Cardiovascular: Normal S1 S2,RRR, No JVD, No murmurs  Respiratory: Lungs clear to auscultation	  Gastrointestinal:  Soft, Non-tender, + BS	  Extremities: Normal range of motion, No clubbing, cyanosis or edema      Home Medications:  allopurinol 100 mg oral tablet: orally 2 times a day (23 Jul 2024 11:44)  ascorbic acid: 500 milligram(s) orally prn (23 Jul 2024 11:48)  aspirin 81 mg oral tablet: 81 milligram(s) orally once a day (23 Jul 2024 11:48)  ergocalciferol 1.25 mg (50,000 intl units) oral capsule: 1 cap(s) orally once a week (23 Jul 2024 11:46)  loratadine 10 mg oral tablet: 1 tab(s) orally once a day (23 Jul 2024 11:47)  multivitamin: 1 tab(s) orally once a day (23 Jul 2024 11:42)  nateglinide 60 mg oral tablet: 1 tab(s) orally 3 times a day (23 Jul 2024 11:45)  omeprazole 20 mg oral delayed release tablet: 1 tab(s) orally once a day (23 Jul 2024 11:42)  Os-Nicola 500 + D: 1 tab(s) orally once a day (23 Jul 2024 11:42)  simvastatin 20 mg oral tablet: 1 tab(s) orally once a day (at bedtime) (23 Jul 2024 11:42)  tamsulosin 0.4 mg oral capsule: 1 cap(s) orally once a day (23 Jul 2024 11:42)  Ursodiol: 300 milligram(s) orally 2 times a day (23 Jul 2024 11:44)      MEDICATIONS  (STANDING):  albuterol/ipratropium for Nebulization 3 milliLiter(s) Nebulizer every 6 hours  ascorbic acid 500 milliGRAM(s) Oral daily  aspirin  chewable 81 milliGRAM(s) Oral daily  chlorhexidine 2% Cloths 1 Application(s) Topical daily  folic acid 1 milliGRAM(s) Oral daily  hydrocortisone sodium succinate Injectable 50 milliGRAM(s) IV Push every 8 hours  midodrine 10 milliGRAM(s) Oral every 8 hours  multivitamin 1 Tablet(s) Oral daily  mupirocin 2% Nasal 1 Application(s) Both Nostrils two times a day  pantoprazole  Injectable 40 milliGRAM(s) IV Push every 12 hours  petrolatum Ophthalmic Ointment 1 Application(s) Both EYES at bedtime  piperacillin/tazobactam IVPB.. 3.375 Gram(s) IV Intermittent every 12 hours  simvastatin 20 milliGRAM(s) Oral at bedtime  sodium bicarbonate  Infusion 0.31 mEq/kG/Hr (75 mL/Hr) IV Continuous <Continuous>      TELEMETRY: 	    ECG:  	  RADIOLOGY:   DIAGNOSTIC TESTING:  [ ] Echocardiogram:  [ ] Catheterization:  [ ] Stress Test:    OTHER: 	    LABS:	 	    CARDIAC MARKERS:        Troponin T, High Sensitivity Result: 226 ng/L (07-23 @ 17:06)                                8.7    4.56  )-----------( 79       ( 27 Jul 2024 11:01 )             24.8     07-27    142  |  99  |  138<H>  ----------------------------<  226<H>  4.6   |  19<L>  |  3.95<H>    Ca    9.5      27 Jul 2024 11:00      proBNP:     Lipid Profile:   HgA1c:     Creatinine: 3.95 mg/dL (07-27-24 @ 11:00)  Creatinine: 3.53 mg/dL (07-26-24 @ 07:48)            
[facility] [place] [facility]   MD JACKELIN MARIA MD RUORU WONG, PA    TEL:  FROM 9 AM to 5 PM ---OFFICE: [phone]  AVAILABLE ON TEAMS    FROM 5 PM - 9 AM PLEASE CALL ANSWERING SERVICE: [phone]    RENAL FOLLOW UP NOTE--Date of Service 07-28-24 @ 07:56  --------------------------------------------------------------------------------  HPI:      Pt seen and examined at bedside.   hypotensive overnight    PAST HISTORY  --------------------------------------------------------------------------------  No significant changes to PMH, PSH, FHx, SHx, unless otherwise noted    ALLERGIES & MEDICATIONS  --------------------------------------------------------------------------------  Allergies    No Known Allergies    Intolerances      Standing Inpatient Medications  albuterol/ipratropium for Nebulization 3 milliLiter(s) Nebulizer every 6 hours  ascorbic acid 500 milliGRAM(s) Oral daily  aspirin  chewable 81 milliGRAM(s) Oral daily  chlorhexidine 2% Cloths 1 Application(s) Topical daily  folic acid 1 milliGRAM(s) Oral daily  hydrocortisone sodium succinate Injectable 50 milliGRAM(s) IV Push every 8 hours  midodrine 10 milliGRAM(s) Oral every 8 hours  multivitamin 1 Tablet(s) Oral daily  mupirocin 2% Nasal 1 Application(s) Both Nostrils two times a day  pantoprazole  Injectable 40 milliGRAM(s) IV Push every 12 hours  petrolatum Ophthalmic Ointment 1 Application(s) Both EYES at bedtime  piperacillin/tazobactam IVPB.. 3.375 Gram(s) IV Intermittent every 12 hours  simvastatin 20 milliGRAM(s) Oral at bedtime  sodium bicarbonate  Infusion 0.31 mEq/kG/Hr IV Continuous <Continuous>    PRN Inpatient Medications  loratadine 10 milliGRAM(s) Oral daily PRN      REVIEW OF SYSTEMS  --------------------------------------------------------------------------------  General: no fever  MSK: no edema     VITALS/PHYSICAL EXAM  --------------------------------------------------------------------------------  T(C): 36.4 (07-28-24 @ 05:09), Max: 37.1 (07-27-24 @ 20:18)  HR: 110 (07-28-24 @ 05:09) (98 - 126)  BP: 89/37 (07-28-24 @ 05:09) (70/44 - 99/57)  RR: 18 (07-28-24 @ 05:09) (18 - 20)  SpO2: 93% (07-28-24 @ 05:09) (90% - 95%)  Wt(kg): --        07-27-24 @ 07:01  -  07-28-24 @ 07:00  --------------------------------------------------------  IN: 0 mL / OUT: 100 mL / NET: -100 mL      Physical Exam:  	Gen: NAD  	HEENT: MMM  	Pulm: CTA B/L  	CV: S1S2  	Abd: Soft, +BS  	Ext: No LE edema B/L                      Neuro: opens eyes  	Skin: Warm and Dry   	Vascular access: NO HD catheter     LABS/STUDIES  --------------------------------------------------------------------------------              8.7    4.56  >-----------<  79       [07-27-24 @ 11:01]              24.8     142  |  99  |  138  ----------------------------<  226      [07-27-24 @ 11:00]  4.6   |  19  |  3.95        Ca     9.5     [07-27-24 @ 11:00]            Creatinine Trend:  SCr 3.95 [07-27 @ 11:00]  SCr 3.53 [07-26 @ 07:48]  SCr 3.03 [07-25 @ 09:12]  SCr 2.79 [07-25 @ 07:03]  SCr 2.86 [07-24 @ 18:01]    Urinalysis - [07-27-24 @ 11:00]      Color  / Appearance  / SG  / pH       Gluc 226 / Ketone   / Bili  / Urobili        Blood  / Protein  / Leuk Est  / Nitrite       RBC  / WBC  / Hyaline  / Gran  / Sq Epi  / Non Sq Epi  / Bacteria     Urine Creatinine 58      [07-24-24 @ 10:10]  Urine Sodium 29      [07-24-24 @ 10:10]  Urine Osmolality 516      [07-24-24 @ 10:10]    TSH 0.33      [07-23-24 @ 07:18]      
[facility] [place] [facility]   MD JACKELIN MARIA MD ANGELA WONG, PA QIAN CHEN, NP    TEL:  OFFICE: [phone]  From 5pm-7am Answering Service [phone]    -- RENAL FOLLOW UP NOTE ---Date of Service 07-26-24 @ 13:48    Patient is a 89y old  Male who presents with a chief complaint of Dehydration, Fever.    Patient seen and examined at bedside. No chest pain/SOB.    VITALS:  T(F): 97.9 (07-26-24 @ 11:44), Max: 98 (07-26-24 @ 06:15)  HR: 85 (07-26-24 @ 13:09)  BP: 71/42 (07-26-24 @ 13:09)  RR: 20 (07-26-24 @ 11:44)  SpO2: 92% (07-26-24 @ 11:44)  Wt(kg): --    07-25 @ 07:01  -  07-26 @ 07:00  --------------------------------------------------------  IN: 240 mL / OUT: 500 mL / NET: -260 mL      PHYSICAL EXAM:  General: NAD  Neck: No JVD  Respiratory: B/L crackles.  Cardiovascular: S1, S2, RRR  Gastrointestinal: BS+, soft, NT/ND  Extremities: + b/l UE edema.    Hospital Medications:   MEDICATIONS  (STANDING):  albuterol/ipratropium for Nebulization 3 milliLiter(s) Nebulizer every 6 hours  ascorbic acid 500 milliGRAM(s) Oral daily  aspirin  chewable 81 milliGRAM(s) Oral daily  chlorhexidine 2% Cloths 1 Application(s) Topical daily  folic acid 1 milliGRAM(s) Oral daily  hydrocortisone sodium succinate Injectable 50 milliGRAM(s) IV Push every 8 hours  midodrine 10 milliGRAM(s) Oral every 8 hours  multivitamin 1 Tablet(s) Oral daily  mupirocin 2% Nasal 1 Application(s) Both Nostrils two times a day  pantoprazole  Injectable 40 milliGRAM(s) IV Push every 12 hours  petrolatum Ophthalmic Ointment 1 Application(s) Both EYES at bedtime  piperacillin/tazobactam IVPB.- 3.375 Gram(s) IV Intermittent once  remdesivir  IVPB   IV Intermittent   remdesivir  IVPB 100 milliGRAM(s) IV Intermittent every 24 hours  simvastatin 20 milliGRAM(s) Oral at bedtime  sodium bicarbonate  Infusion 0.31 mEq/kG/Hr (75 mL/Hr) IV Continuous <Continuous>      LABS:  07-26    143  |  107  |  122<H>  ----------------------------<  123<H>  4.4   |  16<L>  |  3.53<H>    Ca    10.2      26 Jul 2024 07:48  Phos  5.0     07-26  Mg     2.0     07-26    TPro  4.9<L>  /  Alb  2.0<L>  /  TBili  0.3  /  DBili      /  AST  36  /  ALT  24  /  AlkPhos  90  07-26    Creatinine Trend: 3.53 <--, 3.03 <--, 2.79 <--, 2.86 <--, 2.87 <--, 2.99 <--, 3.11 <--, 3.06 <--, 3.02 <--, 3.30 <--    Phosphorus: 5.0 mg/dL (07-26 @ 07:48)  Albumin: 2.0 g/dL (07-26 @ 07:48)                        9.4    4.36  )-----------( 128      ( 26 Jul 2024 10:28 )             28.0     Urine Studies:  Urinalysis - [07-26-24 @ 07:48]      Color  / Appearance  / SG  / pH       Gluc 123 / Ketone   / Bili  / Urobili        Blood  / Protein  / Leuk Est  / Nitrite       RBC  / WBC  / Hyaline  / Gran  / Sq Epi  / Non Sq Epi  / Bacteria     Urine Creatinine 58      [07-24-24 @ 10:10]  Urine Sodium 29      [07-24-24 @ 10:10]  Urine Osmolality 516      [07-24-24 @ 10:10]    TSH 0.33      [07-23-24 @ 07:18]    
Name of Patient : CAM GAMA  MRN: [id number]  Date of visit: 07-25-24 @ 16:43      Subjective: Patient seen and examined. No new events except as noted.   Patient seen earlier this Am. Son is at the bedside. Patient is S/P RRT yesterday due to hypotension.   Now on NC.   Hypotensive again today - on Midodrine and S/P 500 cc bolus.      REVIEW OF SYSTEMS:  Unable to obtain ROS    MEDICATIONS:  MEDICATIONS  (STANDING):  albuterol/ipratropium for Nebulization 3 milliLiter(s) Nebulizer every 6 hours  ascorbic acid 500 milliGRAM(s) Oral daily  aspirin  chewable 81 milliGRAM(s) Oral daily  cefTRIAXone   IVPB 1000 milliGRAM(s) IV Intermittent every 24 hours  chlorhexidine 2% Cloths 1 Application(s) Topical daily  folic acid 1 milliGRAM(s) Oral daily  midodrine 10 milliGRAM(s) Oral every 8 hours  multivitamin 1 Tablet(s) Oral daily  mupirocin 2% Nasal 1 Application(s) Both Nostrils two times a day  pantoprazole  Injectable 40 milliGRAM(s) IV Push every 12 hours  petrolatum Ophthalmic Ointment 1 Application(s) Both EYES at bedtime  remdesivir  IVPB 100 milliGRAM(s) IV Intermittent every 24 hours  remdesivir  IVPB   IV Intermittent   simvastatin 20 milliGRAM(s) Oral at bedtime      PHYSICAL EXAM:  T(C): 36.7 (07-25-24 @ 11:43), Max: 36.9 (07-24-24 @ 21:37)  HR: 68 (07-25-24 @ 11:43) (68 - 91)  BP: 90/50 (07-25-24 @ 12:54) (75/40 - 105/52)  RR: 19 (07-25-24 @ 11:43) (19 - 20)  SpO2: 93% (07-25-24 @ 11:43) (93% - 96%)  Wt(kg): --  I&O's Summary    24 Jul 2024 07:01  -  25 Jul 2024 07:00  --------------------------------------------------------  IN: 1320 mL / OUT: 200 mL / NET: 1120 mL    25 Jul 2024 07:01  -  25 Jul 2024 16:43  --------------------------------------------------------  IN: 0 mL / OUT: 200 mL / NET: -200 mL          Appearance: Frail, weak appearing, cachectic male, lying down in bed 	  HEENT:  Eyes are open   Lymphatic: No lymphadenopathy grossly   Cardiovascular: Normal  Respiratory: + NC, course BS  Gastrointestinal:  Soft, Cachectic  Skin: No rashes,  warm to touch  Psychiatry: Frail   Musculoskeletal: No edema             07-24-24 @ 07:01  -  07-25-24 @ 07:00  --------------------------------------------------------  IN: 1320 mL / OUT: 200 mL / NET: 1120 mL    07-25-24 @ 07:01  -  07-25-24 @ 16:43  --------------------------------------------------------  IN: 0 mL / OUT: 200 mL / NET: -200 mL        < from: TTE W or WO Ultrasound Enhancing Agent (07.24.24 @ 07:46) >  CONCLUSIONS:      1. Left ventricular systolic function is severely decreased with an ejection fraction of 32 % by Singh's method of disks. Regional wall motion abnormalities present.   2. Multiple segmental abnormalities exist. See findings.   3. The right ventricle is not well visualized. reduced right ventricular systolic function. Tricuspid annular plane systolic excursion (TAPSE) is 1.1 cm (normal >=1.7 cm).   4. Aortic valve was not well visualized.   5. No pericardial effusion seen.   6. Pt not cooperative with exam.   7. No prior echocardiogram is available for comparison.   8. There is no evidence of a left ventricular thrombus.    < end of copied text >        < from: Xray Chest 1 View- PORTABLE-Routine (Xray Chest 1 View- PORTABLE-Routine .) (07.25.24 @ 11:25) >  IMPRESSION:    Limited evaluation. Questionable trace right pleural effusion.    < end of copied text >        Culture - Urine (07.23.24 @ 17:06)   Specimen Source: Clean Catch Clean Catch (Midstream)  Culture Results:   No growth    Culture - Blood (07.22.24 @ 11:10)   Specimen Source: .Blood Blood-Peripheral  Culture Results:   No growth at 72 Hours      
Name of Patient : CAM GAMA  MRN: [id number]  Date of visit: 07-27-24       Subjective: Patient seen and examined. No new events except as noted.     REVIEW OF SYSTEMS:  limited     MEDICATIONS:  MEDICATIONS  (STANDING):  albuterol/ipratropium for Nebulization 3 milliLiter(s) Nebulizer every 6 hours  ascorbic acid 500 milliGRAM(s) Oral daily  aspirin  chewable 81 milliGRAM(s) Oral daily  chlorhexidine 2% Cloths 1 Application(s) Topical daily  folic acid 1 milliGRAM(s) Oral daily  hydrocortisone sodium succinate Injectable 50 milliGRAM(s) IV Push every 8 hours  midodrine 10 milliGRAM(s) Oral every 8 hours  multivitamin 1 Tablet(s) Oral daily  mupirocin 2% Nasal 1 Application(s) Both Nostrils two times a day  pantoprazole  Injectable 40 milliGRAM(s) IV Push every 12 hours  petrolatum Ophthalmic Ointment 1 Application(s) Both EYES at bedtime  piperacillin/tazobactam IVPB.. 3.375 Gram(s) IV Intermittent every 12 hours  simvastatin 20 milliGRAM(s) Oral at bedtime  sodium bicarbonate  Infusion 0.31 mEq/kG/Hr (75 mL/Hr) IV Continuous <Continuous>      PHYSICAL EXAM:  T(C): 37.1 (07-27-24 @ 20:18), Max: 37.1 (07-27-24 @ 20:18)  HR: 119 (07-27-24 @ 20:18) (87 - 126)  BP: 73/37 (07-27-24 @ 20:18) (70/44 - 103/40)  RR: 20 (07-27-24 @ 20:18) (18 - 20)  SpO2: 93% (07-27-24 @ 20:18) (90% - 97%)  Wt(kg): --  I&O's Summary    26 Jul 2024 07:01  -  27 Jul 2024 07:00  --------------------------------------------------------  IN: 120 mL / OUT: 450 mL / NET: -330 mL    27 Jul 2024 07:01 - 27 Jul 2024 21:17  --------------------------------------------------------  IN: 0 mL / OUT: 100 mL / NET: -100 mL          Appearance: lethargic   HEENT:  dry OM   Lymphatic: No lymphadenopathy   Cardiovascular: Normal S1 S2, no JVD  Respiratory: normal effort , clear  Gastrointestinal:  Soft, Non-tender  Skin: skin lesions  Psychiatry:  Mood & affect appropriate  Musculuskeletal: contracted     recent labs, Imaging and EKGs personally reviewed     07-26-24 @ 07:01  -  07-27-24 @ 07:00  --------------------------------------------------------  IN: 120 mL / OUT: 450 mL / NET: -330 mL    07-27-24 @ 07:01  -  07-27-24 @ 21:17  --------------------------------------------------------  IN: 0 mL / OUT: 100 mL / NET: -100 mL                            8.7    4.56  )-----------( 79       ( 27 Jul 2024 11:01 )             24.8               07-27    142  |  99  |  138<H>  ----------------------------<  226<H>  4.6   |  19<L>  |  3.95<H>    Ca    9.5      27 Jul 2024 11:00  Phos  5.0     07-26  Mg     2.0     07-26    TPro  4.9<L>  /  Alb  2.0<L>  /  TBili  0.3  /  DBili  x   /  AST  36  /  ALT  24  /  AlkPhos  90  07-26                       Urinalysis Basic - ( 27 Jul 2024 11:00 )    Color: x / Appearance: x / SG: x / pH: x  Gluc: 226 mg/dL / Ketone: x  / Bili: x / Urobili: x   Blood: x / Protein: x / Nitrite: x   Leuk Esterase: x / RBC: x / WBC x   Sq Epi: x / Non Sq Epi: x / Bacteria: x            
Name of Patient : CAM GAMA  MRN: [id number]  Date of visit: 07-28-24       Subjective: Patient seen and examined. No new events except as noted.   frail and lethargic  low BP     REVIEW OF SYSTEMS:  limited       MEDICATIONS:  MEDICATIONS  (STANDING):  albuterol/ipratropium for Nebulization 3 milliLiter(s) Nebulizer every 6 hours  ascorbic acid 500 milliGRAM(s) Oral daily  aspirin  chewable 81 milliGRAM(s) Oral daily  chlorhexidine 2% Cloths 1 Application(s) Topical daily  folic acid 1 milliGRAM(s) Oral daily  hydrocortisone sodium succinate Injectable 50 milliGRAM(s) IV Push every 8 hours  midodrine 10 milliGRAM(s) Oral every 8 hours  multivitamin 1 Tablet(s) Oral daily  mupirocin 2% Nasal 1 Application(s) Both Nostrils two times a day  pantoprazole  Injectable 40 milliGRAM(s) IV Push every 12 hours  petrolatum Ophthalmic Ointment 1 Application(s) Both EYES at bedtime  piperacillin/tazobactam IVPB.. 3.375 Gram(s) IV Intermittent every 12 hours  simvastatin 20 milliGRAM(s) Oral at bedtime  sodium bicarbonate  Infusion 0.31 mEq/kG/Hr (75 mL/Hr) IV Continuous <Continuous>      PHYSICAL EXAM:  T(C): 36.3 (07-28-24 @ 20:00), Max: 37.1 (07-28-24 @ 05:04)  HR: 113 (07-28-24 @ 20:00) (106 - 116)  BP: 90/53 (07-28-24 @ 20:00) (77/46 - 90/53)  RR: 20 (07-28-24 @ 20:00) (18 - 20)  SpO2: 91% (07-28-24 @ 20:00) (91% - 93%)  Wt(kg): --  I&O's Summary    27 Jul 2024 07:01  -  28 Jul 2024 07:00  --------------------------------------------------------  IN: 0 mL / OUT: 100 mL / NET: -100 mL    28 Jul 2024 07:01  -  28 Jul 2024 23:03  --------------------------------------------------------  IN: 0 mL / OUT: 50 mL / NET: -50 mL          Appearance: contracted, lethargic   HEENT:  dry OM   Lymphatic: No lymphadenopathy   Cardiovascular: Normal S1 S2  Respiratory: normal effort , clear    recent labs, Imaging and EKGs personally reviewed     07-27-24 @ 07:01  -  07-28-24 @ 07:00  --------------------------------------------------------  IN: 0 mL / OUT: 100 mL / NET: -100 mL    07-28-24 @ 07:01  -  07-28-24 @ 23:03  --------------------------------------------------------  IN: 0 mL / OUT: 50 mL / NET: -50 mL                          8.7    4.56  )-----------( 79       ( 27 Jul 2024 11:01 )             24.8               07-27    142  |  99  |  138<H>  ----------------------------<  226<H>  4.6   |  19<L>  |  3.95<H>    Ca    9.5      27 Jul 2024 11:00                       ABG - ( 28 Jul 2024 05:56 )  pH, Arterial: 7.50  pH, Blood: x     /  pCO2: 29    /  pO2: 63    / HCO3: 23    / Base Excess: -0.1  /  SaO2: 93.8              Urinalysis Basic - ( 27 Jul 2024 11:00 )    Color: x / Appearance: x / SG: x / pH: x  Gluc: 226 mg/dL / Ketone: x  / Bili: x / Urobili: x   Blood: x / Protein: x / Nitrite: x   Leuk Esterase: x / RBC: x / WBC x   Sq Epi: x / Non Sq Epi: x / Bacteria: x              
Name of Patient : CAM GAMA  MRN: [id number]  Date of visit: 07-26-24     Subjective: Patient seen and examined. No new events except as noted.   lethargic     REVIEW OF SYSTEMS:    limited      MEDICATIONS:  MEDICATIONS  (STANDING):  albuterol/ipratropium for Nebulization 3 milliLiter(s) Nebulizer every 6 hours  ascorbic acid 500 milliGRAM(s) Oral daily  aspirin  chewable 81 milliGRAM(s) Oral daily  chlorhexidine 2% Cloths 1 Application(s) Topical daily  folic acid 1 milliGRAM(s) Oral daily  hydrocortisone sodium succinate Injectable 50 milliGRAM(s) IV Push every 8 hours  midodrine 10 milliGRAM(s) Oral every 8 hours  multivitamin 1 Tablet(s) Oral daily  mupirocin 2% Nasal 1 Application(s) Both Nostrils two times a day  pantoprazole  Injectable 40 milliGRAM(s) IV Push every 12 hours  petrolatum Ophthalmic Ointment 1 Application(s) Both EYES at bedtime  simvastatin 20 milliGRAM(s) Oral at bedtime  sodium bicarbonate  Infusion 0.31 mEq/kG/Hr (75 mL/Hr) IV Continuous <Continuous>      PHYSICAL EXAM:  T(C): 36.8 (07-26-24 @ 22:52), Max: 36.8 (07-26-24 @ 22:52)  HR: 87 (07-26-24 @ 22:52) (74 - 100)  BP: 97/53 (07-26-24 @ 22:52) (66/35 - 117/44)  RR: 20 (07-26-24 @ 22:52) (19 - 20)  SpO2: 92% (07-26-24 @ 22:52) (91% - 100%)  Wt(kg): --  I&O's Summary    25 Jul 2024 07:01  -  26 Jul 2024 07:00  --------------------------------------------------------  IN: 240 mL / OUT: 500 mL / NET: -260 mL    26 Jul 2024 07:01  -  26 Jul 2024 23:02  --------------------------------------------------------  IN: 0 mL / OUT: 150 mL / NET: -150 mL          Appearance: frail, letjargic   HEENT:  PERRLA   Lymphatic: No lymphadenopathy   Cardiovascular: Normal S1 S2, no JVD  Respiratory: normal effort , clear  Gastrointestinal:  Soft, Non-tender  Skin: No rashes,  warm to touch  Psychiatry:  Mood & affect appropriate  Musculuskeletal: No edema    recent labs, Imaging and EKGs personally reviewed     07-25-24 @ 07:01  -  07-26-24 @ 07:00  --------------------------------------------------------  IN: 240 mL / OUT: 500 mL / NET: -260 mL    07-26-24 @ 07:01  -  07-26-24 @ 23:02  --------------------------------------------------------  IN: 0 mL / OUT: 150 mL / NET: -150 mL       4.36  )-----------( 128      ( 26 Jul 2024 10:28 )             28.0               07-26    143  |  107  |  122<H>  ----------------------------<  123<H>  4.4   |  16<L>  |  3.53<H>    Ca    10.2      26 Jul 2024 07:48  Phos  5.0     07-26  Mg     2.0     07-26    TPro  4.9<L>  /  Alb  2.0<L>  /  TBili  0.3  /  DBili  x   /  AST  36  /  ALT  24  /  AlkPhos  90  07-26                       Urinalysis Basic - ( 26 Jul 2024 07:48 )    Color: x / Appearance: x / SG: x / pH: x  Gluc: 123 mg/dL / Ketone: x  / Bili: x / Urobili: x   Blood: x / Protein: x / Nitrite: x   Leuk Esterase: x / RBC: x / WBC x   Sq Epi: x / Non Sq Epi: x / Bacteria: x            
Name of Patient : CAM GAMA  MRN: [id number]  Date of visit: 07-23-24 @ 13:01      Subjective: Patient seen and examined. No new events except as noted.   Patient seen earlier this AM. Son at the bedside. Na without improvement, LR was changed to D51/2 and this AM switched to D5.   Son at the bedside.     REVIEW OF SYSTEMS:  Unable to obtain ROS        MEDICATIONS:  MEDICATIONS  (STANDING):  ascorbic acid 500 milliGRAM(s) Oral daily  aspirin  chewable 81 milliGRAM(s) Oral daily  cefTRIAXone   IVPB 1000 milliGRAM(s) IV Intermittent every 24 hours  chlorhexidine 2% Cloths 1 Application(s) Topical daily  dextrose 5%. 1000 milliLiter(s) (100 mL/Hr) IV Continuous <Continuous>  folic acid 1 milliGRAM(s) Oral daily  heparin   Injectable 5000 Unit(s) SubCutaneous every 12 hours  metoprolol tartrate 25 milliGRAM(s) Oral two times a day  multivitamin 1 Tablet(s) Oral daily  pantoprazole    Tablet 40 milliGRAM(s) Oral before breakfast  petrolatum Ophthalmic Ointment 1 Application(s) Both EYES at bedtime  remdesivir  IVPB 100 milliGRAM(s) IV Intermittent every 24 hours  remdesivir  IVPB   IV Intermittent   simvastatin 20 milliGRAM(s) Oral at bedtime  tamsulosin 0.4 milliGRAM(s) Oral at bedtime      PHYSICAL EXAM:  T(C): 36.7 (07-23-24 @ 11:25), Max: 36.7 (07-22-24 @ 13:48)  HR: 91 (07-23-24 @ 11:25) (91 - 101)  BP: 109/61 (07-23-24 @ 11:25) (109/61 - 128/74)  RR: 18 (07-23-24 @ 11:25) (18 - 19)  SpO2: 93% (07-23-24 @ 11:25) (93% - 100%)  Wt(kg): --  I&O's Summary    23 Jul 2024 07:01  -  23 Jul 2024 13:01  --------------------------------------------------------  IN: 0 mL / OUT: 50 mL / NET: -50 mL          Appearance: Frail, weak appearing, cachectic male, lying down in bed 	  HEENT:  Eyes are open   Lymphatic: No lymphadenopathy grossly   Cardiovascular: Normal  Respiratory: normal effort on RA, clear  Gastrointestinal:  Soft, Cachectic  Skin: No rashes,  warm to touch  Psychiatry: Frail   Musculoskeletal: No edema           07-23-24 @ 07:01  -  07-23-24 @ 13:01  --------------------------------------------------------  IN: 0 mL / OUT: 50 mL / NET: -50 mL                                  10.5   5.38  )-----------( 112      ( 23 Jul 2024 07:18 )             35.2               07-23    162<HH>  |  122<H>  |  95<H>  ----------------------------<  67<L>  4.0   |  25  |  3.11<H>    Ca    11.6<H>      23 Jul 2024 07:18  Phos  3.4     07-23  Mg     2.2     07-23    TPro  6.4  /  Alb  2.8<L>  /  TBili  0.3  /  DBili  x   /  AST  55<H>  /  ALT  18  /  AlkPhos  78  07-23    PT/INR - ( 22 Jul 2024 11:20 )   PT: 14.8 sec;   INR: 1.42 ratio         PTT - ( 22 Jul 2024 11:20 )  PTT:28.4 sec                   Urinalysis Basic - ( 23 Jul 2024 07:18 )    Color: x / Appearance: x / SG: x / pH: x  Gluc: 67 mg/dL / Ketone: x  / Bili: x / Urobili: x   Blood: x / Protein: x / Nitrite: x   Leuk Esterase: x / RBC: x / WBC x   Sq Epi: x / Non Sq Epi: x / Bacteria: x      < from: US Kidney and Bladder (07.22.24 @ 14:47) >    IMPRESSION:  Bilateral atrophic, echogenic, and lobulated kidneys consistent with   chronic medical renal disease. There is limited evaluation of the left   kidney; however, no evidence for hydronephrosis bilaterally. Suggest   follow-up as clinically warranted.    Bilateral renal cysts.    There is echogenic layering debris present within the urinary bladder.   This may represent  debris in the setting of infection or sediment with   small stones/gravel. Hemorrhagic material is also not excluded. Suggest   correlation with urinalysis. The Patiño catheter is identified within the   urinary bladder; however, it is not completely collapsed. Suggest   correlation with catheter output.    < end of copied text >  
Name of Patient : CAM GAMA  MRN: [id number]  Date of visit: 07-24-24 @ 17:41      Subjective: Patient seen and examined. No new events except as noted.   Patient seen earlier this AM. Lying down in bed, son at the bedside  Hypoglycemic this AM- due to being NPO, started on D5 and Pureed diet by S+S  Episode of melena this AM. ASA and Heparin held   Continues on RDV and ABX   NA with improvement    REVIEW OF SYSTEMS:  Unable to obtain ROS        MEDICATIONS:  MEDICATIONS  (STANDING):  ascorbic acid 500 milliGRAM(s) Oral daily  cefTRIAXone   IVPB 1000 milliGRAM(s) IV Intermittent every 24 hours  chlorhexidine 2% Cloths 1 Application(s) Topical daily  dextrose 5%. 1000 milliLiter(s) (100 mL/Hr) IV Continuous <Continuous>  folic acid 1 milliGRAM(s) Oral daily  metoprolol tartrate 25 milliGRAM(s) Oral two times a day  multivitamin 1 Tablet(s) Oral daily  mupirocin 2% Nasal 1 Application(s) Both Nostrils two times a day  pantoprazole  Injectable 40 milliGRAM(s) IV Push every 12 hours  petrolatum Ophthalmic Ointment 1 Application(s) Both EYES at bedtime  remdesivir  IVPB 100 milliGRAM(s) IV Intermittent every 24 hours  remdesivir  IVPB   IV Intermittent   simvastatin 20 milliGRAM(s) Oral at bedtime  tamsulosin 0.4 milliGRAM(s) Oral at bedtime      PHYSICAL EXAM:  T(C): 36.7 (07-24-24 @ 11:10), Max: 36.9 (07-23-24 @ 20:37)  HR: 73 (07-24-24 @ 11:10) (73 - 98)  BP: 99/50 (07-24-24 @ 11:10) (99/50 - 126/58)  RR: 19 (07-24-24 @ 11:10) (19 - 19)  SpO2: 94% (07-24-24 @ 11:10) (94% - 94%)  Wt(kg): --  I&O's Summary    23 Jul 2024 07:01  -  24 Jul 2024 07:00  --------------------------------------------------------  IN: 0 mL / OUT: 150 mL / NET: -150 mL    24 Jul 2024 07:01  -  24 Jul 2024 17:41  --------------------------------------------------------  IN: 0 mL / OUT: 0 mL / NET: 0 mL          Appearance: Frail, weak appearing, cachectic male, lying down in bed 	  HEENT:  Eyes are open   Lymphatic: No lymphadenopathy grossly   Cardiovascular: Normal  Respiratory: normal effort on RA, clear  Gastrointestinal:  Soft, Cachectic  Skin: No rashes,  warm to touch  Psychiatry: Frail   Musculoskeletal: No edema           07-23-24 @ 07:01  -  07-24-24 @ 07:00  --------------------------------------------------------  IN: 0 mL / OUT: 150 mL / NET: -150 mL    07-24-24 @ 07:01  -  07-24-24 @ 17:41  --------------------------------------------------------  IN: 0 mL / OUT: 0 mL / NET: 0 mL                                9.9    7.72  )-----------( 107      ( 24 Jul 2024 07:11 )             33.1               07-24    151<H>  |  120<H>  |  98<H>  ----------------------------<  57<L>  4.5   |  17<L>  |  2.87<H>    Ca    10.9<H>      24 Jul 2024 07:12  Phos  3.4     07-23  Mg     2.2     07-23    TPro  6.4  /  Alb  2.8<L>  /  TBili  0.3  /  DBili  x   /  AST  55<H>  /  ALT  18  /  AlkPhos  78  07-23                       Urinalysis Basic - ( 24 Jul 2024 07:12 )    Color: x / Appearance: x / SG: x / pH: x  Gluc: 57 mg/dL / Ketone: x  / Bili: x / Urobili: x   Blood: x / Protein: x / Nitrite: x   Leuk Esterase: x / RBC: x / WBC x   Sq Epi: x / Non Sq Epi: x / Bacteria: x        Culture - Blood (07.22.24 @ 11:10)   Specimen Source: .Blood Blood-Peripheral  Culture Results:   No growth at 48 Hours    Culture - Blood (07.22.24 @ 11:00)   Specimen Source: .Blood Blood-Peripheral  Culture Results:   No growth at 48 Hours      < from: [facility] Duplex Lower Ext Vein Scan, [place] (07.23.24 @ 15:08) >  IMPRESSION:    No evidence of deep venous thrombosis in either lower extremity.      < end of copied text >  
[facility]   MD JACKELIN MARIA MD RUORU WONG, PA    TEL:  FROM 9 AM to 5 PM ---OFFICE: [phone]  AVAILABLE ON TEAMS    FROM 5 PM - 9 AM PLEASE CALL ANSWERING SERVICE: [phone]    RENAL FOLLOW UP NOTE--Date of Service 07-23-24 @ 08:42  --------------------------------------------------------------------------------  HPI:      Pt seen and examined at bedside.       PAST HISTORY  --------------------------------------------------------------------------------  No significant changes to PMH, PSH, FHx, SHx, unless otherwise noted    ALLERGIES & MEDICATIONS  --------------------------------------------------------------------------------  Allergies    No Known Allergies    Intolerances      Standing Inpatient Medications  ascorbic acid 500 milliGRAM(s) Oral daily  aspirin  chewable 81 milliGRAM(s) Oral daily  calcium carbonate 1250 mG  + Vitamin D (OsCal 500 + D) 1 Tablet(s) Oral daily  cefTRIAXone   IVPB 1000 milliGRAM(s) IV Intermittent every 24 hours  dextrose 5% + sodium chloride 0.45%. 1000 milliLiter(s) IV Continuous <Continuous>  folic acid 1 milliGRAM(s) Oral daily  heparin   Injectable 5000 Unit(s) SubCutaneous every 12 hours  metoprolol tartrate 25 milliGRAM(s) Oral two times a day  multivitamin 1 Tablet(s) Oral daily  pantoprazole    Tablet 40 milliGRAM(s) Oral before breakfast  petrolatum Ophthalmic Ointment 1 Application(s) Both EYES at bedtime  remdesivir  IVPB 100 milliGRAM(s) IV Intermittent every 24 hours  remdesivir  IVPB   IV Intermittent   simvastatin 20 milliGRAM(s) Oral at bedtime  tamsulosin 0.4 milliGRAM(s) Oral at bedtime    PRN Inpatient Medications  loratadine 10 milliGRAM(s) Oral daily PRN      REVIEW OF SYSTEMS  --------------------------------------------------------------------------------  General: no fever  MSK: no edema     VITALS/PHYSICAL EXAM  --------------------------------------------------------------------------------  T(C): 36 (07-23-24 @ 06:14), Max: 38.1 (07-22-24 @ 11:00)  HR: 96 (07-23-24 @ 06:14) (92 - 109)  BP: 128/62 (07-23-24 @ 06:14) (93/61 - 128/74)  RR: 18 (07-23-24 @ 06:14) (12 - 19)  SpO2: 96% (07-23-24 @ 06:14) (94% - 100%)  Wt(kg): --  Height (cm): 165.1 (07-22-24 @ 10:44)  Weight (kg): 36.3 (07-22-24 @ 10:44)  BMI (kg/m2): 13.3 (07-22-24 @ 10:44)  BSA (m2): 1.34 (07-22-24 @ 10:44)      Physical Exam:  	Gen: NAD  	HEENT: MMM  	Pulm: CTA B/L  	CV: S1S2  	Abd: Soft, +BS  	Ext: No LE edema B/L                      Neuro:lethragic  	Skin: Warm and Dry   	Vascular access: NO HD catheter      LABS/STUDIES  --------------------------------------------------------------------------------              10.5   5.38  >-----------<  112      [07-23-24 @ 07:18]              35.2     162  |  122  |  95  ----------------------------<  67      [07-23-24 @ 07:18]  4.0   |  25  |  3.11        Ca     11.6     [07-23-24 @ 07:18]      Mg     2.2     [07-23-24 @ 07:18]      Phos  3.4     [07-23-24 @ 07:18]    TPro  6.4  /  Alb  2.8  /  TBili  0.3  /  DBili  x   /  AST  55  /  ALT  18  /  AlkPhos  78  [07-23-24 @ 07:18]    PT/INR: PT 14.8 , INR 1.42       [07-22-24 @ 11:20]  PTT: 28.4       [07-22-24 @ 11:20]      Creatinine Trend:  SCr 3.11 [07-23 @ 07:18]  SCr 3.06 [07-23 @ 01:18]  SCr 3.02 [07-22 @ 19:04]  SCr 3.30 [07-22 @ 11:20]    Urinalysis - [07-23-24 @ 07:18]      Color  / Appearance  / SG  / pH       Gluc 67 / Ketone   / Bili  / Urobili        Blood  / Protein  / Leuk Est  / Nitrite       RBC  / WBC  / Hyaline  / Gran  / Sq Epi  / Non Sq Epi  / Bacteria           
[facility] [place]   MD JACKELIN MARIA MD ANGELA WONG, WILLIAM MASON, NP    TEL:  OFFICE: [phone]  From 5pm-7am Answering Service [phone]    -- RENAL FOLLOW UP NOTE ---Date of Service 07-25-24 @ 13:15    Patient is a 89y old  Male who presents with a chief complaint of Dehydration, Fever.    Patient seen and examined at bedside. No chest pain/SOB.    VITALS:  T(F): 98 (07-25-24 @ 11:43), Max: 98.5 (07-25-24 @ 00:20)  HR: 68 (07-25-24 @ 11:43)  BP: 90/50 (07-25-24 @ 12:54)  RR: 19 (07-25-24 @ 11:43)  SpO2: 93% (07-25-24 @ 11:43)  Wt(kg): --    07-24 @ 07:01  -  07-25 @ 07:00  --------------------------------------------------------  IN: 1320 mL / OUT: 200 mL / NET: 1120 mL    07-25 @ 07:01  -  07-25 @ 13:15  --------------------------------------------------------  IN: 0 mL / OUT: 200 mL / NET: -200 mL      PHYSICAL EXAM:  General: NAD  Neck: No JVD  Respiratory: CTAB, no wheezes, rales or rhonchi  Cardiovascular: S1, S2, RRR  Gastrointestinal: BS+, soft, NT/ND  Extremities: No peripheral edema    Hospital Medications:   MEDICATIONS  (STANDING):  albuterol/ipratropium for Nebulization 3 milliLiter(s) Nebulizer every 6 hours  ascorbic acid 500 milliGRAM(s) Oral daily  aspirin  chewable 81 milliGRAM(s) Oral daily  cefTRIAXone   IVPB 1000 milliGRAM(s) IV Intermittent every 24 hours  chlorhexidine 2% Cloths 1 Application(s) Topical daily  folic acid 1 milliGRAM(s) Oral daily  midodrine 10 milliGRAM(s) Oral every 8 hours  multivitamin 1 Tablet(s) Oral daily  mupirocin 2% Nasal 1 Application(s) Both Nostrils two times a day  pantoprazole  Injectable 40 milliGRAM(s) IV Push every 12 hours  petrolatum Ophthalmic Ointment 1 Application(s) Both EYES at bedtime  remdesivir  IVPB 100 milliGRAM(s) IV Intermittent every 24 hours  remdesivir  IVPB   IV Intermittent   simvastatin 20 milliGRAM(s) Oral at bedtime  tamsulosin 0.4 milliGRAM(s) Oral at bedtime      LABS:  07-25    143  |  109<H>  |  107<H>  ----------------------------<  188<H>  4.9   |  17<L>  |  3.03<H>    Ca    10.1      25 Jul 2024 09:12  Phos  4.2     07-25  Mg     1.8     07-25    TPro  4.8<L>  /  Alb  2.0<L>  /  TBili  0.3  /  DBili      /  AST  36  /  ALT  20  /  AlkPhos  66  07-24    Creatinine Trend: 3.03 <--, 2.79 <--, 2.86 <--, 2.87 <--, 2.99 <--, 3.11 <--, 3.06 <--, 3.02 <--, 3.30 <--    Phosphorus: 4.2 mg/dL (07-25 @ 07:03)  Albumin: 2.0 g/dL (07-24 @ 18:01)  Phosphorus: 4.1 mg/dL (07-24 @ 18:01)                          8.9    7.00  )-----------( 101      ( 25 Jul 2024 09:09 )             28.1     Urine Studies:  Urinalysis - [07-25-24 @ 09:12]      Color  / Appearance  / SG  / pH       Gluc 188 / Ketone   / Bili  / Urobili        Blood  / Protein  / Leuk Est  / Nitrite       RBC  / WBC  / Hyaline  / Gran  / Sq Epi  / Non Sq Epi  / Bacteria     Urine Creatinine 58      [07-24-24 @ 10:10]  Urine Sodium 29      [07-24-24 @ 10:10]  Urine Osmolality 516      [07-24-24 @ 10:10]    TSH 0.33      [07-23-24 @ 07:18]        
[facility] [place] [facility]   MD JACKELIN MARIA MD RUORU WONG, PA    TEL:  FROM 9 AM to 5 PM ---OFFICE: [phone]  AVAILABLE ON TEAMS    FROM 5 PM - 9 AM PLEASE CALL ANSWERING SERVICE: [phone]    RENAL FOLLOW UP NOTE--Date of Service 07-27-24 @ 09:42  --------------------------------------------------------------------------------  HPI:      Pt seen and examined at bedside.     PAST HISTORY  --------------------------------------------------------------------------------  No significant changes to PMH, PSH, FHx, SHx, unless otherwise noted    ALLERGIES & MEDICATIONS  --------------------------------------------------------------------------------  Allergies    No Known Allergies    Intolerances      Standing Inpatient Medications  albuterol/ipratropium for Nebulization 3 milliLiter(s) Nebulizer every 6 hours  ascorbic acid 500 milliGRAM(s) Oral daily  aspirin  chewable 81 milliGRAM(s) Oral daily  chlorhexidine 2% Cloths 1 Application(s) Topical daily  folic acid 1 milliGRAM(s) Oral daily  hydrocortisone sodium succinate Injectable 50 milliGRAM(s) IV Push every 8 hours  midodrine 10 milliGRAM(s) Oral every 8 hours  multivitamin 1 Tablet(s) Oral daily  mupirocin 2% Nasal 1 Application(s) Both Nostrils two times a day  pantoprazole  Injectable 40 milliGRAM(s) IV Push every 12 hours  petrolatum Ophthalmic Ointment 1 Application(s) Both EYES at bedtime  piperacillin/tazobactam IVPB.. 3.375 Gram(s) IV Intermittent every 12 hours  simvastatin 20 milliGRAM(s) Oral at bedtime  sodium bicarbonate  Infusion 0.31 mEq/kG/Hr IV Continuous <Continuous>    PRN Inpatient Medications  loratadine 10 milliGRAM(s) Oral daily PRN      REVIEW OF SYSTEMS  --------------------------------------------------------------------------------  General: no fever  MSK: no edema     VITALS/PHYSICAL EXAM  --------------------------------------------------------------------------------  T(C): 36.3 (07-27-24 @ 08:36), Max: 36.8 (07-26-24 @ 22:52)  HR: 111 (07-27-24 @ 08:36) (74 - 111)  BP: 96/52 (07-27-24 @ 08:36) (66/35 - 117/44)  RR: 18 (07-27-24 @ 08:36) (18 - 20)  SpO2: 91% (07-27-24 @ 08:36) (91% - 97%)  Wt(kg): --        07-26-24 @ 07:01  -  07-27-24 @ 07:00  --------------------------------------------------------  IN: 120 mL / OUT: 450 mL / NET: -330 mL      Physical Exam:  	Gen: NAD  	HEENT: MMM  	Pulm: CTA B/L  	CV: S1S2  	Abd: Soft, +BS  	Ext: No LE edema B/L                      Neuro lethargic  	Skin: Warm and Dry   	Vascular access: NO HD catheter           GEOFF christianson  LABS/STUDIES  --------------------------------------------------------------------------------              9.4    4.36  >-----------<  128      [07-26-24 @ 10:28]              28.0     143  |  107  |  122  ----------------------------<  123      [07-26-24 @ 07:48]  4.4   |  16  |  3.53        Ca     10.2     [07-26-24 @ 07:48]      Mg     2.0     [07-26-24 @ 07:48]      Phos  5.0     [07-26-24 @ 07:48]    TPro  4.9  /  Alb  2.0  /  TBili  0.3  /  DBili  x   /  AST  36  /  ALT  24  /  AlkPhos  90  [07-26-24 @ 07:48]          Creatinine Trend:  SCr 3.53 [07-26 @ 07:48]  SCr 3.03 [07-25 @ 09:12]  SCr 2.79 [07-25 @ 07:03]  SCr 2.86 [07-24 @ 18:01]  SCr 2.87 [07-24 @ 07:12]    Urinalysis - [07-26-24 @ 07:48]      Color  / Appearance  / SG  / pH       Gluc 123 / Ketone   / Bili  / Urobili        Blood  / Protein  / Leuk Est  / Nitrite       RBC  / WBC  / Hyaline  / Gran  / Sq Epi  / Non Sq Epi  / Bacteria     Urine Creatinine 58      [07-24-24 @ 10:10]  Urine Sodium 29      [07-24-24 @ 10:10]  Urine Osmolality 516      [07-24-24 @ 10:10]    TSH 0.33      [07-23-24 @ 07:18]      
Follow-up Pulm Progress Note    lethargic  ROS unable to be obtained  sats 100% on 2LNC     Medications:  MEDICATIONS  (STANDING):  albuterol/ipratropium for Nebulization 3 milliLiter(s) Nebulizer every 6 hours  ascorbic acid 500 milliGRAM(s) Oral daily  aspirin  chewable 81 milliGRAM(s) Oral daily  cefTRIAXone   IVPB 1000 milliGRAM(s) IV Intermittent every 24 hours  chlorhexidine 2% Cloths 1 Application(s) Topical daily  folic acid 1 milliGRAM(s) Oral daily  midodrine 10 milliGRAM(s) Oral every 8 hours  multivitamin 1 Tablet(s) Oral daily  mupirocin 2% Nasal 1 Application(s) Both Nostrils two times a day  pantoprazole  Injectable 40 milliGRAM(s) IV Push every 12 hours  petrolatum Ophthalmic Ointment 1 Application(s) Both EYES at bedtime  remdesivir  IVPB 100 milliGRAM(s) IV Intermittent every 24 hours  remdesivir  IVPB   IV Intermittent   simvastatin 20 milliGRAM(s) Oral at bedtime    MEDICATIONS  (PRN):  loratadine 10 milliGRAM(s) Oral daily PRN Allergy          Vital Signs Last 24 Hrs  T(C): 36.7 (26 Jul 2024 06:15), Max: 36.7 (25 Jul 2024 11:43)  T(F): 98 (26 Jul 2024 06:15), Max: 98 (25 Jul 2024 11:43)  HR: 87 (26 Jul 2024 06:15) (68 - 106)  BP: 100/52 (26 Jul 2024 06:15) (75/40 - 100/52)  BP(mean): --  RR: 20 (26 Jul 2024 06:15) (18 - 20)  SpO2: 100% (26 Jul 2024 06:15) (93% - 100%)    Parameters below as of 26 Jul 2024 06:15  Patient On (Oxygen Delivery Method): nasal cannula  O2 Flow (L/min): 2        VBG pH 7.44 07-24 @ 18:01    VBG pCO2 32 07-24 @ 18:01    VBG O2 sat 48.1 07-24 @ 18:01    VBG lactate 3.7 07-24 @ 18:01      07-25 @ 07:01  -  07-26 @ 07:00  --------------------------------------------------------  IN: 240 mL / OUT: 500 mL / NET: -260 mL          LABS:                        8.9    7.00  )-----------( 101      ( 25 Jul 2024 09:09 )             28.1     07-26    143  |  107  |  122<H>  ----------------------------<  123<H>  4.4   |  16<L>  |  3.53<H>    Ca    10.2      26 Jul 2024 07:48  Phos  5.0     07-26  Mg     2.0     07-26    TPro  4.9<L>  /  Alb  2.0<L>  /  TBili  0.3  /  DBili  x   /  AST  36  /  ALT  24  /  AlkPhos  90  07-26          CAPILLARY BLOOD GLUCOSE      POCT Blood Glucose.: 125 mg/dL (26 Jul 2024 08:27)      Urinalysis Basic - ( 26 Jul 2024 07:48 )    Color: x / Appearance: x / SG: x / pH: x  Gluc: 123 mg/dL / Ketone: x  / Bili: x / Urobili: x   Blood: x / Protein: x / Nitrite: x   Leuk Esterase: x / RBC: x / WBC x   Sq Epi: x / Non Sq Epi: x / Bacteria: x                      CULTURES:     Culture - Blood (collected 07-22-24 @ 11:10)  Source: .Blood Blood-Peripheral  Preliminary Report (07-25-24 @ 16:01):    No growth at 72 Hours    Culture - Blood (collected 07-22-24 @ 11:00)  Source: .Blood Blood-Peripheral  Preliminary Report (07-25-24 @ 16:01):    No growth at 72 Hours        Culture - Urine (collected 07-23-24 @ 17:06)  Source: Clean Catch Clean Catch (Midstream)  Final Report (07-24-24 @ 18:02):    No growth                  Physical Examination:  PULM: CTA bilaterally   CVS: S1, S2 heard      RADIOLOGY REVIEWED  CXR 7/25: atelectasis 
Follow-up Pulm Progress Note    RRT yesterday for hypotension   placed on 2LNC overnight  nonlabored   ROS unable to be obtained         Medications:  MEDICATIONS  (STANDING):  ascorbic acid 500 milliGRAM(s) Oral daily  cefTRIAXone   IVPB 1000 milliGRAM(s) IV Intermittent every 24 hours  chlorhexidine 2% Cloths 1 Application(s) Topical daily  folic acid 1 milliGRAM(s) Oral daily  midodrine 10 milliGRAM(s) Oral every 8 hours  multivitamin 1 Tablet(s) Oral daily  mupirocin 2% Nasal 1 Application(s) Both Nostrils two times a day  pantoprazole  Injectable 40 milliGRAM(s) IV Push every 12 hours  petrolatum Ophthalmic Ointment 1 Application(s) Both EYES at bedtime  remdesivir  IVPB 100 milliGRAM(s) IV Intermittent every 24 hours  remdesivir  IVPB   IV Intermittent   simvastatin 20 milliGRAM(s) Oral at bedtime  tamsulosin 0.4 milliGRAM(s) Oral at bedtime    MEDICATIONS  (PRN):  loratadine 10 milliGRAM(s) Oral daily PRN Allergy          Vital Signs Last 24 Hrs  T(C): 36.7 (25 Jul 2024 06:18), Max: 36.9 (24 Jul 2024 21:37)  T(F): 98.1 (25 Jul 2024 06:18), Max: 98.5 (25 Jul 2024 00:20)  HR: 89 (25 Jul 2024 08:21) (73 - 91)  BP: 100/55 (25 Jul 2024 08:21) (76/44 - 105/52)  BP(mean): --  RR: 20 (25 Jul 2024 06:18) (19 - 20)  SpO2: 96% (25 Jul 2024 06:18) (94% - 96%)    Parameters below as of 25 Jul 2024 06:18  Patient On (Oxygen Delivery Method): nasal cannula  O2 Flow (L/min): 2        VBG pH 7.44 07-24 @ 18:01    VBG pCO2 32 07-24 @ 18:01    VBG O2 sat 48.1 07-24 @ 18:01    VBG lactate 3.7 07-24 @ 18:01      07-24 @ 07:01  -  07-25 @ 07:00  --------------------------------------------------------  IN: 1320 mL / OUT: 200 mL / NET: 1120 mL          LABS:                        8.9    7.00  )-----------( 101      ( 25 Jul 2024 09:09 )             28.1     07-25    137  |  104  |  102<H>  ----------------------------<  546<HH>  3.8   |  17<L>  |  2.79<H>    Ca    9.4      25 Jul 2024 07:03  Phos  4.2     07-25  Mg     1.8     07-25    TPro  4.8<L>  /  Alb  2.0<L>  /  TBili  0.3  /  DBili  x   /  AST  36  /  ALT  20  /  AlkPhos  66  07-24          CAPILLARY BLOOD GLUCOSE      POCT Blood Glucose.: 231 mg/dL (25 Jul 2024 05:24)      Urinalysis Basic - ( 25 Jul 2024 07:03 )    Color: x / Appearance: x / SG: x / pH: x  Gluc: 546 mg/dL / Ketone: x  / Bili: x / Urobili: x   Blood: x / Protein: x / Nitrite: x   Leuk Esterase: x / RBC: x / WBC x   Sq Epi: x / Non Sq Epi: x / Bacteria: x            CULTURES:    Culture - Blood (collected 07-22-24 @ 11:10)  Source: .Blood Blood-Peripheral  Preliminary Report (07-24-24 @ 16:02):    No growth at 48 Hours    Culture - Blood (collected 07-22-24 @ 11:00)  Source: .Blood Blood-Peripheral  Preliminary Report (07-24-24 @ 16:02):    No growth at 48 Hours        Culture - Urine (collected 07-23-24 @ 17:06)  Source: Clean Catch Clean Catch (Midstream)  Final Report (07-24-24 @ 18:02):    No growth              Physical Examination:  PULM: b/l rhonchi   CVS: S1, S2 heard      RADIOLOGY REVIEWED  CXR 7/22/24: grossly clear   
Follow-up Pulm Progress Note    lethargic, ROS unable to be obtained   nonlabored        Medications:  MEDICATIONS  (STANDING):  ascorbic acid 500 milliGRAM(s) Oral daily  aspirin  chewable 81 milliGRAM(s) Oral daily  cefTRIAXone   IVPB 1000 milliGRAM(s) IV Intermittent every 24 hours  chlorhexidine 2% Cloths 1 Application(s) Topical daily  folic acid 1 milliGRAM(s) Oral daily  heparin   Injectable 5000 Unit(s) SubCutaneous every 12 hours  metoprolol tartrate 25 milliGRAM(s) Oral two times a day  multivitamin 1 Tablet(s) Oral daily  pantoprazole    Tablet 40 milliGRAM(s) Oral before breakfast  petrolatum Ophthalmic Ointment 1 Application(s) Both EYES at bedtime  remdesivir  IVPB 100 milliGRAM(s) IV Intermittent every 24 hours  remdesivir  IVPB   IV Intermittent   simvastatin 20 milliGRAM(s) Oral at bedtime  tamsulosin 0.4 milliGRAM(s) Oral at bedtime    MEDICATIONS  (PRN):  loratadine 10 milliGRAM(s) Oral daily PRN Allergy          Vital Signs Last 24 Hrs  T(C): 36.7 (24 Jul 2024 04:39), Max: 36.9 (23 Jul 2024 20:37)  T(F): 98.1 (24 Jul 2024 04:39), Max: 98.4 (23 Jul 2024 20:37)  HR: 98 (24 Jul 2024 04:39) (91 - 98)  BP: 105/62 (24 Jul 2024 04:39) (105/62 - 126/58)  BP(mean): --  RR: 18 (23 Jul 2024 11:25) (18 - 18)  SpO2: 93% (23 Jul 2024 11:25) (93% - 93%)    Parameters below as of 24 Jul 2024 04:39  Patient On (Oxygen Delivery Method): room air          VBG pH 7.44 07-22 @ 11:00    VBG pCO2 45 07-22 @ 11:00    VBG O2 sat 36.2 07-22 @ 11:00    VBG lactate 4.3 07-22 @ 11:00      07-23 @ 07:01  -  07-24 @ 07:00  --------------------------------------------------------  IN: 0 mL / OUT: 150 mL / NET: -150 mL          LABS:                        9.9    7.72  )-----------( 107      ( 24 Jul 2024 07:11 )             33.1     07-24    151<H>  |  120<H>  |  98<H>  ----------------------------<  57<L>  4.5   |  17<L>  |  2.87<H>    Ca    10.9<H>      24 Jul 2024 07:12  Phos  3.4     07-23  Mg     2.2     07-23    TPro  6.4  /  Alb  2.8<L>  /  TBili  0.3  /  DBili  x   /  AST  55<H>  /  ALT  18  /  AlkPhos  78  07-23          CAPILLARY BLOOD GLUCOSE      POCT Blood Glucose.: 76 mg/dL (24 Jul 2024 07:10)    PT/INR - ( 22 Jul 2024 11:20 )   PT: 14.8 sec;   INR: 1.42 ratio         PTT - ( 22 Jul 2024 11:20 )  PTT:28.4 sec  Urinalysis Basic - ( 24 Jul 2024 07:12 )    Color: x / Appearance: x / SG: x / pH: x  Gluc: 57 mg/dL / Ketone: x  / Bili: x / Urobili: x   Blood: x / Protein: x / Nitrite: x   Leuk Esterase: x / RBC: x / WBC x   Sq Epi: x / Non Sq Epi: x / Bacteria: x              CULTURES:     Culture - Blood (collected 07-22-24 @ 11:10)  Source: .Blood Blood-Peripheral  Preliminary Report (07-23-24 @ 16:02):    No growth at 24 hours    Culture - Blood (collected 07-22-24 @ 11:00)  Source: .Blood Blood-Peripheral  Preliminary Report (07-23-24 @ 16:02):    No growth at 24 hours            Physical Examination:  PULM: Clear to auscultation bilaterally, no significant sputum production  CVS: S1, S2 heard      RADIOLOGY REVIEWED  CXR 7/22/24: grossly clear

## 2024-07-29 LAB — GLUCOSE BLDC GLUCOMTR-MCNC: 144 MG/DL — HIGH (ref 70–99)

## 2024-07-29 NOTE — PROVIDER CONTACT NOTE (OTHER) - ACTION/TREATMENT ORDERED:
Provider aware 250cc NS bolus ordered.
ekg, bmp, mg, phos, will montior
Provider coming to see Pt at bedside.
Provider aware. 250 LR bolus x1 given & Stress steroids and maintenance fluids ot be ordered.
Provider made aware. BMP, Mg. Phos ordered and obtatined
Provider made aware. EKG obtained and looked over by provider.  EKG did not show A-fib
Provider made aware. EKG ordered
advised to administer midodrine + solumedrol early + f/u w/ Bp for additional bolus, Code status: DNR/DNI family wants no pressors/peg

## 2024-07-29 NOTE — CHART NOTE - NSCHARTNOTEFT_GEN_A_CORE
Informed by RN that pt is noted to have progressively slowing HR 30's to 20's, and was unable to obtain BP or pulse oximetry measurements  Pt seen at bedside, no spontaneous breathing was noted, Lt pupil fixed and dilated   Cardiac monitor revealed no cardiac activity    Pt examined, heart sounds absent, and no measurable BP was obtainable  Pt pronnounced  @ 23:50 on 24    Pt's son Massiel Blas was called and informed, condolences offered to him    Dr. Nunez informed of expiration Informed by RN that pt is noted to have progressively slowing HR 30's to 20's, and was unable to obtain BP or pulse oximetry measurements  Pt seen at bedside, no spontaneous breathing was noted, Lt pupil fixed and dilated   Cardiac monitor revealed no cardiac activity    Pt examined, heart sounds absent, and no measurable BP was obtainable  Pt pronnounced  @ 23:50 on 24    Pt's son Massiel Blas was called and informed, condolences offered to him  Declined autopsy    Dr. Nunez informed of expiration

## 2024-07-29 NOTE — PROVIDER CONTACT NOTE (OTHER) - BACKGROUND
Pt admitted for failure to thrive. Hx of hypertension, hyperlipidemia, CAD status post stents and bph
Pt admitted for failure to thrive. History of hypertension, hyperlipidemia, CAD status post stents, and BPH
pt admitted for failure to thrive. hx of hypertension, hyperlipidemia, CAD status post stents, BPH
adult failure to thrive, covid, uti
Pt admitted for Failure to thrive in adult
Pt admitted for Failure to thrive in adult
89-year-old male history of hypertension, hyperlipidemia, CAD status post stents, BPH, bedbound, minimally verbal at baseline, response due to worsening mental status for 1 day.
Pt is A0x0, normally SR/ST on tele. Admitted for Failure to Thrive in adult and diagnosed with COVID. DNR/ DNI status active.

## 2024-07-29 NOTE — PROVIDER CONTACT NOTE (OTHER) - ASSESSMENT
pt A&Ox0 non-verbal at baseline, no s/s of distress noted, pt responsive to tactile stimuli. BP 73/37, 2nd attempt 75/32 hr 112 Sao2 91% on 4L NC rr 18 temp 98.8
Aox0. Pt has no history of afib
Pt AAOx0. VSS except pt hypotensive BP 66/35; no s/s of  CP, SOB, no acute events noted on tele
Aox O baseline VS /65 , T96.8 O2 95
Pt AAOx0. Hypotensive to the 70s. no s/s of  CP, SOB.
Pt aox0 at baseline VS T 98, HR 93 BP 1108/62 o2 94
/52, 87, 98.0, 98%.  Pt aox0, no s/s of distress noted.
RN assessed pt at bedside. Pt HR 20-30s. BP and pulse ox unattainable. Manual BP attempted but unable to measure. Pupils fixed and agonal breathing noted.

## 2024-07-29 NOTE — DISCHARGE NOTE FOR THE EXPIRED PATIENT - HOSPITAL COURSE
HPI:  Patient is a 89 year old male with a PMHx of HTN, HLD, CAD S/P stents, Dementia, Type II DM, BPH, bedbound, minimally verbal at baseline who presents to [facility] accompanied by his son. History obtained from Son at the bedside as patient with dementia and AxO X 0. Per son, patient has been only consuming ensures for 3 months now, no other oral intake. Per son, patient brought in due to worsening mental status X 1 day, and appears more disoriented. In the ER, patient was found to be severely dehydration and febrile to 100.5.    Pt became septic, possibly due to UTI, and was being treated with IV Antibiotics. When he became hypotensive, he was given IVF boluses, and started on Hydrocortisone   He was also undergoing treatement with Remdisivir for COVID infection, but no steroids as pt was not hypoxic  In spite of treatment regimen, pt condition declined, and he  on 24 @ 23:50.  Pt code status was DNR/DNI. Pt's son was informed and condolences offered.

## 2024-07-29 NOTE — PROVIDER CONTACT NOTE (OTHER) - DATE AND TIME:
26-Jul-2024 06:26
23-Jul-2024 06:50
27-Jul-2024 20:56
23-Jul-2024 22:10
28-Jul-2024 23:28
27-Jul-2024 16:30
23-Jul-2024 00:35
26-Jul-2024 10:05

## 2024-07-29 NOTE — PROVIDER CONTACT NOTE (OTHER) - RECOMMENDATIONS
Notify Provider. Bolus?
EKG
Provider made aware.
Notify provider
Notify Provider.
ekg, bmp, mg, phos
notify provider
Notify provider,

## 2024-07-29 NOTE — PROVIDER CONTACT NOTE (OTHER) - NAME OF MD/NP/PA/DO NOTIFIED:
Mary Alice Bailey, NP
WILLIAM Chavez
Kim Purcell
WILLIAM fang
Kim Purcell
Catrachito THOMAS
Maria T Webb NP
WILLIAM Chavez

## 2024-07-29 NOTE — PROVIDER CONTACT NOTE (OTHER) - REASON
Pt had 6 bts wtc
Pt heart rate decreasing 30s-20s
Pt hypotensive
pt hypotensive + additional DTI's noted to b/l LE
Pt had 12 BtS WCT
Tele tech notified that patient may be in afib
Pt hypotensive 77/46 repeat 70/44
pt noted with 25 beats wct on tele

## 2024-07-29 NOTE — PROVIDER CONTACT NOTE (OTHER) - SITUATION
Pt hypotensive
PT had 12 bts WTC for 4.84 seconds Hr up to 190 bmp
Pt HR decreasing 30-20s according to tele monitor. Tele tech notified RN.
pt continues to be chronically hypotensive bp 73/37, bp 75/32, shift handoff pt had bolus 250cc NS minimal response in pressure change, pt also noted w/ additional DTI's to b/l LE's
pt noted with 25 beats wct on tele
Pt hypotensive 77/46 repeat 70/44
Pt had 6 bts wtc HR up to 150. Duration 3.33 seconds
Tele tech notified that patient may be in afib